# Patient Record
Sex: MALE | Race: BLACK OR AFRICAN AMERICAN | NOT HISPANIC OR LATINO | Employment: OTHER | URBAN - METROPOLITAN AREA
[De-identification: names, ages, dates, MRNs, and addresses within clinical notes are randomized per-mention and may not be internally consistent; named-entity substitution may affect disease eponyms.]

---

## 2019-07-01 ENCOUNTER — HOSPITAL ENCOUNTER (OUTPATIENT)
Dept: GENERAL RADIOLOGY | Facility: HOSPITAL | Age: 59
Discharge: HOME | End: 2019-07-01
Admitting: ANESTHESIOLOGY

## 2019-07-01 ENCOUNTER — TRANSCRIBE ORDERS (OUTPATIENT)
Dept: ADMINISTRATIVE | Facility: HOSPITAL | Age: 59
End: 2019-07-01

## 2019-07-01 ENCOUNTER — HOSPITAL ENCOUNTER (OUTPATIENT)
Dept: CARDIOLOGY | Facility: HOSPITAL | Age: 59
Discharge: HOME | End: 2019-07-01

## 2019-07-01 ENCOUNTER — LAB (OUTPATIENT)
Dept: LAB | Facility: HOSPITAL | Age: 59
End: 2019-07-01

## 2019-07-01 DIAGNOSIS — Z01.818 PRE-OP TESTING: ICD-10-CM

## 2019-07-01 DIAGNOSIS — N13.8 ENLARGED PROSTATE WITH URINARY OBSTRUCTION: ICD-10-CM

## 2019-07-01 DIAGNOSIS — N40.1 ENLARGED PROSTATE WITH URINARY OBSTRUCTION: ICD-10-CM

## 2019-07-01 DIAGNOSIS — N40.1 ENLARGED PROSTATE WITH URINARY OBSTRUCTION: Primary | ICD-10-CM

## 2019-07-01 DIAGNOSIS — N13.8 ENLARGED PROSTATE WITH URINARY OBSTRUCTION: Primary | ICD-10-CM

## 2019-07-01 LAB
ABO GROUP BLD: NORMAL
ANION GAP SERPL CALCULATED.3IONS-SCNC: 13.9 MMOL/L (ref 10–20)
APTT PPP: 26.5 SECONDS (ref 24–31)
BASOPHILS # BLD AUTO: 0.1 10*3/MM3 (ref 0–0.2)
BASOPHILS NFR BLD AUTO: 0.9 % (ref 0–1.5)
BLD GP AB SCN SERPL QL: NEGATIVE
BUN BLD-MCNC: 12 MG/DL (ref 8–20)
BUN/CREAT SERPL: 10 (ref 6.2–20.3)
CALCIUM SPEC-SCNC: 9.1 MG/DL (ref 8.9–10.3)
CHLORIDE SERPL-SCNC: 102 MMOL/L (ref 101–111)
CO2 SERPL-SCNC: 27 MMOL/L (ref 22–32)
CREAT BLD-MCNC: 1.2 MG/DL (ref 0.7–1.2)
DEPRECATED RDW RBC AUTO: 41.1 FL (ref 37–54)
EOSINOPHIL # BLD AUTO: 0.4 10*3/MM3 (ref 0–0.4)
EOSINOPHIL NFR BLD AUTO: 5.2 % (ref 0.3–6.2)
ERYTHROCYTE [DISTWIDTH] IN BLOOD BY AUTOMATED COUNT: 13.8 % (ref 12.3–15.4)
GFR SERPL CREATININE-BSD FRML MDRD: 75 ML/MIN/1.73
GLUCOSE BLD-MCNC: 96 MG/DL (ref 65–99)
HCT VFR BLD AUTO: 38.7 % (ref 37.5–51)
HGB BLD-MCNC: 13 G/DL (ref 13–17.7)
INR PPP: 0.93 (ref 0.9–1.1)
LYMPHOCYTES # BLD AUTO: 1.2 10*3/MM3 (ref 0.7–3.1)
LYMPHOCYTES NFR BLD AUTO: 15.5 % (ref 19.6–45.3)
MCH RBC QN AUTO: 28.5 PG (ref 26.6–33)
MCHC RBC AUTO-ENTMCNC: 33.7 G/DL (ref 31.5–35.7)
MCV RBC AUTO: 84.5 FL (ref 79–97)
MONOCYTES # BLD AUTO: 0.6 10*3/MM3 (ref 0.1–0.9)
MONOCYTES NFR BLD AUTO: 7.7 % (ref 5–12)
NEUTROPHILS # BLD AUTO: 5.6 10*3/MM3 (ref 1.7–7)
NEUTROPHILS NFR BLD AUTO: 70.7 % (ref 42.7–76)
NRBC BLD AUTO-RTO: 0.1 /100 WBC (ref 0–0.2)
PLATELET # BLD AUTO: 345 10*3/MM3 (ref 140–450)
PMV BLD AUTO: 7.2 FL (ref 6–12)
POTASSIUM BLD-SCNC: 3.9 MMOL/L (ref 3.6–5.1)
PROTHROMBIN TIME: 9.7 SECONDS (ref 9.6–11.7)
RBC # BLD AUTO: 4.57 10*6/MM3 (ref 4.14–5.8)
RH BLD: POSITIVE
SODIUM BLD-SCNC: 139 MMOL/L (ref 136–144)
T&S EXPIRATION DATE: NORMAL
WBC NRBC COR # BLD: 7.9 10*3/MM3 (ref 3.4–10.8)

## 2019-07-01 PROCEDURE — 85025 COMPLETE CBC W/AUTO DIFF WBC: CPT

## 2019-07-01 PROCEDURE — 36415 COLL VENOUS BLD VENIPUNCTURE: CPT

## 2019-07-01 PROCEDURE — 85610 PROTHROMBIN TIME: CPT

## 2019-07-01 PROCEDURE — 86850 RBC ANTIBODY SCREEN: CPT | Performed by: ANESTHESIOLOGY

## 2019-07-01 PROCEDURE — 86901 BLOOD TYPING SEROLOGIC RH(D): CPT

## 2019-07-01 PROCEDURE — 86900 BLOOD TYPING SEROLOGIC ABO: CPT | Performed by: ANESTHESIOLOGY

## 2019-07-01 PROCEDURE — 93005 ELECTROCARDIOGRAM TRACING: CPT | Performed by: ANESTHESIOLOGY

## 2019-07-01 PROCEDURE — 71046 X-RAY EXAM CHEST 2 VIEWS: CPT

## 2019-07-01 PROCEDURE — 86901 BLOOD TYPING SEROLOGIC RH(D): CPT | Performed by: ANESTHESIOLOGY

## 2019-07-01 PROCEDURE — 80048 BASIC METABOLIC PNL TOTAL CA: CPT

## 2019-07-01 PROCEDURE — 86900 BLOOD TYPING SEROLOGIC ABO: CPT

## 2019-07-01 PROCEDURE — 85730 THROMBOPLASTIN TIME PARTIAL: CPT

## 2019-07-02 PROCEDURE — 93010 ELECTROCARDIOGRAM REPORT: CPT | Performed by: INTERNAL MEDICINE

## 2019-07-05 PROCEDURE — 88305 TISSUE EXAM BY PATHOLOGIST: CPT | Performed by: UROLOGY

## 2019-07-06 ENCOUNTER — LAB REQUISITION (OUTPATIENT)
Dept: LAB | Facility: HOSPITAL | Age: 59
End: 2019-07-06

## 2019-07-06 DIAGNOSIS — N40.1 ENLARGED PROSTATE WITH LOWER URINARY TRACT SYMPTOMS (LUTS): ICD-10-CM

## 2019-07-06 DIAGNOSIS — R33.9 RETENTION OF URINE: ICD-10-CM

## 2019-07-08 LAB
LAB AP CASE REPORT: NORMAL
PATH REPORT.FINAL DX SPEC: NORMAL
PATH REPORT.GROSS SPEC: NORMAL

## 2020-10-11 ENCOUNTER — OFFICE (OUTPATIENT)
Dept: URBAN - METROPOLITAN AREA LAB 2 | Facility: LAB | Age: 60
End: 2020-10-11
Payer: MEDICAID

## 2020-10-11 DIAGNOSIS — Z20.828 CONTACT WITH AND (SUSPECTED) EXPOSURE TO OTHER VIRAL COMMUNI: ICD-10-CM

## 2020-10-11 PROCEDURE — U0002 COVID-19 LAB TEST NON-CDC: HCPCS | Performed by: INTERNAL MEDICINE

## 2020-10-12 PROBLEM — Z12.11 SCREENING FOR COLONIC NEOPLASIA: Status: ACTIVE | Noted: 2020-10-13

## 2020-10-13 ENCOUNTER — AMBULATORY SURGICAL CENTER (OUTPATIENT)
Dept: URBAN - METROPOLITAN AREA SURGERY 17 | Facility: SURGERY | Age: 60
End: 2020-10-13
Payer: MEDICAID

## 2020-10-13 ENCOUNTER — OFFICE (OUTPATIENT)
Dept: URBAN - METROPOLITAN AREA PATHOLOGY 4 | Facility: PATHOLOGY | Age: 60
End: 2020-10-13
Payer: MEDICAID

## 2020-10-13 VITALS
DIASTOLIC BLOOD PRESSURE: 75 MMHG | OXYGEN SATURATION: 99 % | TEMPERATURE: 98.1 F | SYSTOLIC BLOOD PRESSURE: 128 MMHG | SYSTOLIC BLOOD PRESSURE: 151 MMHG | DIASTOLIC BLOOD PRESSURE: 73 MMHG | DIASTOLIC BLOOD PRESSURE: 106 MMHG | RESPIRATION RATE: 12 BRPM | RESPIRATION RATE: 13 BRPM | SYSTOLIC BLOOD PRESSURE: 125 MMHG | HEART RATE: 63 BPM | HEART RATE: 75 BPM | HEART RATE: 65 BPM | HEIGHT: 69 IN | RESPIRATION RATE: 17 BRPM | SYSTOLIC BLOOD PRESSURE: 121 MMHG | HEART RATE: 67 BPM | DIASTOLIC BLOOD PRESSURE: 59 MMHG | WEIGHT: 160 LBS | HEART RATE: 51 BPM | OXYGEN SATURATION: 98 % | DIASTOLIC BLOOD PRESSURE: 70 MMHG | RESPIRATION RATE: 14 BRPM | DIASTOLIC BLOOD PRESSURE: 61 MMHG | OXYGEN SATURATION: 100 % | SYSTOLIC BLOOD PRESSURE: 116 MMHG | SYSTOLIC BLOOD PRESSURE: 112 MMHG | DIASTOLIC BLOOD PRESSURE: 64 MMHG | DIASTOLIC BLOOD PRESSURE: 65 MMHG | HEART RATE: 78 BPM | RESPIRATION RATE: 15 BRPM | HEART RATE: 64 BPM | SYSTOLIC BLOOD PRESSURE: 175 MMHG | TEMPERATURE: 96.5 F | SYSTOLIC BLOOD PRESSURE: 115 MMHG | HEART RATE: 68 BPM | SYSTOLIC BLOOD PRESSURE: 130 MMHG | SYSTOLIC BLOOD PRESSURE: 108 MMHG | HEART RATE: 71 BPM | RESPIRATION RATE: 18 BRPM | RESPIRATION RATE: 16 BRPM | SYSTOLIC BLOOD PRESSURE: 132 MMHG | HEART RATE: 66 BPM | DIASTOLIC BLOOD PRESSURE: 74 MMHG

## 2020-10-13 DIAGNOSIS — K57.30 DIVERTICULOSIS OF LARGE INTESTINE WITHOUT PERFORATION OR ABS: ICD-10-CM

## 2020-10-13 DIAGNOSIS — D12.0 BENIGN NEOPLASM OF CECUM: ICD-10-CM

## 2020-10-13 DIAGNOSIS — Z12.11 ENCOUNTER FOR SCREENING FOR MALIGNANT NEOPLASM OF COLON: ICD-10-CM

## 2020-10-13 DIAGNOSIS — K63.5 POLYP OF COLON: ICD-10-CM

## 2020-10-13 LAB
GI HISTOLOGY: A. UNSPECIFIED: (no result)
GI HISTOLOGY: B. UNSPECIFIED: (no result)
GI HISTOLOGY: PDF REPORT: (no result)

## 2020-10-13 PROCEDURE — 45385 COLONOSCOPY W/LESION REMOVAL: CPT | Mod: PT | Performed by: INTERNAL MEDICINE

## 2020-10-13 PROCEDURE — 88305 TISSUE EXAM BY PATHOLOGIST: CPT | Performed by: INTERNAL MEDICINE

## 2020-10-13 NOTE — SERVICEHPINOTES
60-year-old -American gentleman without GI complaints.  Family history is negative.  He presents for a screening colonoscopy. He says he had polyps removed years ago but does not know how long ago or the type of polyps.

## 2021-03-22 ENCOUNTER — BULK ORDERING (OUTPATIENT)
Dept: CASE MANAGEMENT | Facility: OTHER | Age: 61
End: 2021-03-22

## 2021-03-22 DIAGNOSIS — Z23 IMMUNIZATION DUE: ICD-10-CM

## 2022-01-28 ENCOUNTER — HOSPITAL ENCOUNTER (OUTPATIENT)
Dept: GENERAL RADIOLOGY | Facility: HOSPITAL | Age: 62
Discharge: HOME | End: 2022-01-28
Admitting: INTERNAL MEDICINE

## 2022-01-28 ENCOUNTER — OFFICE VISIT (OUTPATIENT)
Dept: INTERNAL MEDICINE | Facility: CLINIC | Age: 62
End: 2022-01-28

## 2022-01-28 VITALS
WEIGHT: 157 LBS | DIASTOLIC BLOOD PRESSURE: 100 MMHG | TEMPERATURE: 98 F | HEIGHT: 69 IN | BODY MASS INDEX: 23.25 KG/M2 | SYSTOLIC BLOOD PRESSURE: 160 MMHG

## 2022-01-28 DIAGNOSIS — I49.3 PVC (PREMATURE VENTRICULAR CONTRACTION): ICD-10-CM

## 2022-01-28 DIAGNOSIS — I10 BENIGN ESSENTIAL HYPERTENSION: ICD-10-CM

## 2022-01-28 DIAGNOSIS — E55.9 VITAMIN D DEFICIENCY: Primary | ICD-10-CM

## 2022-01-28 DIAGNOSIS — D86.0 PULMONARY SARCOIDOSIS: Primary | ICD-10-CM

## 2022-01-28 DIAGNOSIS — Z11.59 NEED FOR HEPATITIS C SCREENING TEST: ICD-10-CM

## 2022-01-28 DIAGNOSIS — E55.9 VITAMIN D DEFICIENCY: ICD-10-CM

## 2022-01-28 DIAGNOSIS — B35.1 ONYCHOMYCOSIS: ICD-10-CM

## 2022-01-28 DIAGNOSIS — R21 RASH: ICD-10-CM

## 2022-01-28 PROBLEM — N40.0 BENIGN PROSTATIC HYPERPLASIA: Status: ACTIVE | Noted: 2020-08-14

## 2022-01-28 PROBLEM — N20.0 KIDNEY STONE: Status: ACTIVE | Noted: 2020-08-14

## 2022-01-28 PROCEDURE — 93000 ELECTROCARDIOGRAM COMPLETE: CPT | Performed by: INTERNAL MEDICINE

## 2022-01-28 PROCEDURE — 99204 OFFICE O/P NEW MOD 45 MIN: CPT | Performed by: INTERNAL MEDICINE

## 2022-01-28 PROCEDURE — 71046 X-RAY EXAM CHEST 2 VIEWS: CPT

## 2022-01-28 RX ORDER — LOSARTAN POTASSIUM 100 MG/1
100 TABLET ORAL DAILY
Qty: 90 TABLET | Refills: 1 | Status: SHIPPED | OUTPATIENT
Start: 2022-01-28 | End: 2022-05-24 | Stop reason: SDUPTHER

## 2022-01-28 RX ORDER — TAMSULOSIN HYDROCHLORIDE 0.4 MG/1
CAPSULE ORAL
COMMUNITY

## 2022-01-28 RX ORDER — LOSARTAN POTASSIUM 100 MG/1
TABLET ORAL
COMMUNITY
End: 2022-01-28 | Stop reason: SDUPTHER

## 2022-01-28 RX ORDER — TERBINAFINE HYDROCHLORIDE 250 MG/1
250 TABLET ORAL DAILY
COMMUNITY
End: 2022-03-29

## 2022-01-28 RX ORDER — FLUTICASONE PROPIONATE 50 MCG
2 SPRAY, SUSPENSION (ML) NASAL AS NEEDED
COMMUNITY

## 2022-01-28 NOTE — PROGRESS NOTES
Subjective        Chief Complaint   Patient presents with   • Annual Exam           Wm Mandel is a 61 y.o. male who presents for    Patient Active Problem List   Diagnosis   • Pulmonary sarcoidosis (HCC)   • Onychomycosis   • Benign essential hypertension   • Benign prostatic hyperplasia       History of Present Illness     He had been seeing . He has not been seeing anyone in the interim. He retired as a . He had surgery on his bladder twice with Dr. Carrero. He has a h/o BPH. He has a suprapubic catheter. He has not been taking Losartan. He checks his BP but he does not remember the numbers. He sees the podiatrist and he is taking Lamisil and tolnaftate for nail fungus on his right great toe. He saw a pulmonologist years ago. He does not remember who he saw. He denies cough or dyspnea. He has had a rash on his shins.  He has been using a cream on it.  Allergies   Allergen Reactions   • Shingrix [Zoster Vac Recomb Adjuvanted] Rash       Current Outpatient Medications on File Prior to Visit   Medication Sig Dispense Refill   • fluticasone (FLONASE) 50 MCG/ACT nasal spray 2 sprays into the nostril(s) as directed by provider Daily.     • tamsulosin (FLOMAX) 0.4 MG capsule 24 hr capsule tamsulosin 0.4 mg capsule   TK 1 C PO QD     • terbinafine (lamiSIL) 250 MG tablet Take 250 mg by mouth Daily.     • Tolnaftate 1 % gel Apply  topically.     • [DISCONTINUED] losartan (COZAAR) 100 MG tablet losartan 100 mg tablet   TK 1 T PO  DAILY       No current facility-administered medications on file prior to visit.       Past Medical History:   Diagnosis Date   • Hyperlipidemia    • Tremor     seen at        Past Surgical History:   Procedure Laterality Date   • COLONOSCOPY  10/13/2020   • PROSTATE SURGERY     • SUPRAPUBIC CATHETER INSERTION         Family History   Problem Relation Age of Onset   • Cancer Mother    • Other Father         gunshot       Social History     Socioeconomic History   •  "Marital status: Single   Tobacco Use   • Smoking status: Never Smoker   • Smokeless tobacco: Never Used   Substance and Sexual Activity   • Alcohol use: Never   • Drug use: Never   • Sexual activity: Yes     Birth control/protection: Condom           The following portions of the patient's history were reviewed and updated as appropriate: problem list, allergies, current medications, past medical history, past family history, past social history and past surgical history.    Review of Systems   Constitutional: Negative for chills, fever and unexpected weight loss.   HENT: Negative for postnasal drip and sore throat.    Eyes: Negative for blurred vision.   Respiratory: Negative for cough, shortness of breath and wheezing.    Cardiovascular: Negative for chest pain and leg swelling.   Gastrointestinal: Negative for abdominal pain, blood in stool, nausea, vomiting and GERD.   Endocrine: Negative for polyuria.   Genitourinary: Negative for dysuria, frequency and hematuria.   Musculoskeletal: Negative for gait problem.   Skin: Negative for rash.   Allergic/Immunologic: Negative for immunocompromised state.   Neurological: Negative for weakness.   Hematological: Does not bruise/bleed easily.   Psychiatric/Behavioral: Negative for depressed mood. The patient is not nervous/anxious.        Immunization History   Administered Date(s) Administered   • COVID-19 (PFIZER) PURPLE CAP 04/01/2021, 04/22/2021, 11/10/2021   • Influenza, Unspecified 10/22/2021   • Tdap 09/11/2020       Objective   Vitals:    01/28/22 0937   BP: 160/100   Temp: 98 °F (36.7 °C)   Weight: 71.2 kg (157 lb)   Height: 175.3 cm (69\")     Body mass index is 23.18 kg/m².  Physical Exam  Vitals reviewed.   Constitutional:       Appearance: He is well-developed.   HENT:      Head: Normocephalic and atraumatic.      Comments: Enlarged tonsils with poor dentition     Mouth/Throat:      Mouth: Mucous membranes are moist.      Pharynx: Oropharynx is clear.   Eyes: "      Extraocular Movements: Extraocular movements intact.      Conjunctiva/sclera: Conjunctivae normal.      Pupils: Pupils are equal, round, and reactive to light.   Neck:      Thyroid: No thyromegaly.      Vascular: No carotid bruit.   Cardiovascular:      Rate and Rhythm: Normal rate and regular rhythm. Occasional extrasystoles are present.     Heart sounds: Normal heart sounds. No murmur heard.      Pulmonary:      Effort: Pulmonary effort is normal.      Breath sounds: Normal breath sounds.   Abdominal:      General: There is no distension.      Palpations: Abdomen is soft. There is no mass.      Tenderness: There is no abdominal tenderness. There is no rebound.      Comments: Suprapubic catheter in place   Musculoskeletal:      Cervical back: Neck supple.   Lymphadenopathy:      Cervical: No cervical adenopathy.   Skin:     General: Skin is warm.      Comments: Brown hyperpigmentation on his shins.   Neurological:      Mental Status: He is alert.   Psychiatric:         Behavior: Behavior normal.           ECG 12 Lead    Date/Time: 1/28/2022 10:38 AM  Performed by: Dale Andrade MD  Authorized by: Dale Andrade MD   Comparison: compared with previous ECG from 8/5/2020  Comparison to previous ECG: Now with PVC  Rhythm: sinus rhythm  Ectopy: unifocal PVCs  Rate: normal  Conduction: 1st degree AV block  QRS axis: normal    Clinical impression: abnormal EKG  Comments: First degree AVB with PVCs            Assessment/Plan   Diagnoses and all orders for this visit:    1. Pulmonary sarcoidosis (HCC) (Primary)  -     XR Chest PA & Lateral    2. Benign essential hypertension  -     Comprehensive Metabolic Panel  -     Lipid Panel With / Chol / HDL Ratio  -     CBC & Differential  -     TSH  -     Magnesium  -     losartan (COZAAR) 100 MG tablet; Take 1 tablet by mouth Daily.  Dispense: 90 tablet; Refill: 1  -     ECG 12 Lead    3. Rash  -     Ambulatory Referral to Dermatology    4. PVC (premature  ventricular contraction)  -     Holter monitor - 24 hour; Future  -     ECG 12 Lead    5. Need for hepatitis C screening test  -     Hepatitis C Antibody    6. Vitamin D deficiency  -     Vitamin D 25 Hydroxy               Get cscope from Dr. Cristhian Aguilera from 2020 and records Dr. Tejeda.    R/s Losartan. Work up PVCs. Reviewed labs he brought in from last PCP.  Return in about 4 weeks (around 2/25/2022), or 30 minutes.

## 2022-02-14 ENCOUNTER — HOSPITAL ENCOUNTER (OUTPATIENT)
Dept: CARDIOLOGY | Facility: HOSPITAL | Age: 62
Discharge: HOME | End: 2022-02-14
Admitting: INTERNAL MEDICINE

## 2022-02-14 DIAGNOSIS — I49.3 PVC (PREMATURE VENTRICULAR CONTRACTION): ICD-10-CM

## 2022-02-14 PROCEDURE — 93225 XTRNL ECG REC<48 HRS REC: CPT

## 2022-02-14 PROCEDURE — 93226 XTRNL ECG REC<48 HR SCAN A/R: CPT

## 2022-02-16 LAB
MAXIMAL PREDICTED HEART RATE: 158 BPM
STRESS TARGET HR: 134 BPM

## 2022-02-16 PROCEDURE — 93227 XTRNL ECG REC<48 HR R&I: CPT | Performed by: INTERNAL MEDICINE

## 2022-02-17 DIAGNOSIS — I49.3 PVC (PREMATURE VENTRICULAR CONTRACTION): Primary | ICD-10-CM

## 2022-02-18 ENCOUNTER — TELEPHONE (OUTPATIENT)
Dept: INTERNAL MEDICINE | Facility: CLINIC | Age: 62
End: 2022-02-18

## 2022-02-18 NOTE — TELEPHONE ENCOUNTER
Patient is wanting our office to get records from his surgeon Dr. Carrero urologist he has seen him since 2019, patient states he has had 2 surgeries with him and they have put him on a strict diet and he has been stressing out since then, patient also saw Dr. Tejeda  please call [phone]

## 2022-02-22 DIAGNOSIS — E55.9 VITAMIN D DEFICIENCY: Primary | ICD-10-CM

## 2022-02-22 LAB
25(OH)D3+25(OH)D2 SERPL-MCNC: 10.6 NG/ML (ref 30–100)
ALBUMIN SERPL-MCNC: 4.2 G/DL (ref 3.8–4.8)
ALBUMIN/GLOB SERPL: 1.7 {RATIO} (ref 1.2–2.2)
ALP SERPL-CCNC: 67 IU/L (ref 44–121)
ALT SERPL-CCNC: 13 IU/L (ref 0–44)
AST SERPL-CCNC: 17 IU/L (ref 0–40)
BASOPHILS # BLD AUTO: 0.1 X10E3/UL (ref 0–0.2)
BASOPHILS NFR BLD AUTO: 1 %
BILIRUB SERPL-MCNC: 0.4 MG/DL (ref 0–1.2)
BUN SERPL-MCNC: 18 MG/DL (ref 8–27)
BUN/CREAT SERPL: 14 (ref 10–24)
CALCIUM SERPL-MCNC: 9.1 MG/DL (ref 8.6–10.2)
CHLORIDE SERPL-SCNC: 104 MMOL/L (ref 96–106)
CHOLEST SERPL-MCNC: 153 MG/DL (ref 100–199)
CHOLEST/HDLC SERPL: 3.3 RATIO (ref 0–5)
CO2 SERPL-SCNC: 21 MMOL/L (ref 20–29)
CREAT SERPL-MCNC: 1.28 MG/DL (ref 0.76–1.27)
EOSINOPHIL # BLD AUTO: 0.3 X10E3/UL (ref 0–0.4)
EOSINOPHIL NFR BLD AUTO: 6 %
ERYTHROCYTE [DISTWIDTH] IN BLOOD BY AUTOMATED COUNT: 13.3 % (ref 11.6–15.4)
GLOBULIN SER CALC-MCNC: 2.5 G/DL (ref 1.5–4.5)
GLUCOSE SERPL-MCNC: 76 MG/DL (ref 65–99)
HCT VFR BLD AUTO: 39.8 % (ref 37.5–51)
HCV AB S/CO SERPL IA: <0.1 S/CO RATIO (ref 0–0.9)
HDLC SERPL-MCNC: 46 MG/DL
HGB BLD-MCNC: 13.2 G/DL (ref 13–17.7)
IMM GRANULOCYTES # BLD AUTO: 0 X10E3/UL (ref 0–0.1)
IMM GRANULOCYTES NFR BLD AUTO: 0 %
LDLC SERPL CALC-MCNC: 94 MG/DL (ref 0–99)
LYMPHOCYTES # BLD AUTO: 1.2 X10E3/UL (ref 0.7–3.1)
LYMPHOCYTES NFR BLD AUTO: 21 %
MAGNESIUM SERPL-MCNC: 2.2 MG/DL (ref 1.6–2.3)
MCH RBC QN AUTO: 27.8 PG (ref 26.6–33)
MCHC RBC AUTO-ENTMCNC: 33.2 G/DL (ref 31.5–35.7)
MCV RBC AUTO: 84 FL (ref 79–97)
MONOCYTES # BLD AUTO: 0.5 X10E3/UL (ref 0.1–0.9)
MONOCYTES NFR BLD AUTO: 8 %
NEUTROPHILS # BLD AUTO: 3.6 X10E3/UL (ref 1.4–7)
NEUTROPHILS NFR BLD AUTO: 64 %
PLATELET # BLD AUTO: 278 X10E3/UL (ref 150–450)
POTASSIUM SERPL-SCNC: 4.3 MMOL/L (ref 3.5–5.2)
PROT SERPL-MCNC: 6.7 G/DL (ref 6–8.5)
RBC # BLD AUTO: 4.74 X10E6/UL (ref 4.14–5.8)
SODIUM SERPL-SCNC: 139 MMOL/L (ref 134–144)
TRIGL SERPL-MCNC: 63 MG/DL (ref 0–149)
TSH SERPL DL<=0.005 MIU/L-ACNC: 1.75 UIU/ML (ref 0.45–4.5)
VLDLC SERPL CALC-MCNC: 13 MG/DL (ref 5–40)
WBC # BLD AUTO: 5.7 X10E3/UL (ref 3.4–10.8)

## 2022-02-22 RX ORDER — ERGOCALCIFEROL 1.25 MG/1
50000 CAPSULE ORAL WEEKLY
Qty: 5 CAPSULE | Refills: 5 | Status: SHIPPED | OUTPATIENT
Start: 2022-02-22 | End: 2022-03-29 | Stop reason: SDUPTHER

## 2022-02-24 ENCOUNTER — HOSPITAL ENCOUNTER (OUTPATIENT)
Dept: CARDIOLOGY | Facility: HOSPITAL | Age: 62
Discharge: HOME | End: 2022-02-24
Admitting: INTERNAL MEDICINE

## 2022-02-24 VITALS
BODY MASS INDEX: 23.25 KG/M2 | HEART RATE: 55 BPM | WEIGHT: 157 LBS | HEIGHT: 69 IN | DIASTOLIC BLOOD PRESSURE: 102 MMHG | SYSTOLIC BLOOD PRESSURE: 164 MMHG

## 2022-02-24 DIAGNOSIS — I49.3 PVC (PREMATURE VENTRICULAR CONTRACTION): ICD-10-CM

## 2022-02-24 PROCEDURE — 25010000002 PERFLUTREN (DEFINITY) 8.476 MG IN SODIUM CHLORIDE (PF) 0.9 % 10 ML INJECTION: Performed by: INTERNAL MEDICINE

## 2022-02-24 PROCEDURE — 93306 TTE W/DOPPLER COMPLETE: CPT | Performed by: INTERNAL MEDICINE

## 2022-02-24 PROCEDURE — 93306 TTE W/DOPPLER COMPLETE: CPT

## 2022-02-24 PROCEDURE — 93356 MYOCRD STRAIN IMG SPCKL TRCK: CPT

## 2022-02-24 PROCEDURE — 93356 MYOCRD STRAIN IMG SPCKL TRCK: CPT | Performed by: INTERNAL MEDICINE

## 2022-02-24 RX ADMIN — PERFLUTREN 3 ML: 6.52 INJECTION, SUSPENSION INTRAVENOUS at 14:36

## 2022-02-25 LAB
AORTIC ARCH: 2.9 CM
ASCENDING AORTA: 3.2 CM
BH CV ECHO LEFT VENTRICLE GLOBAL LONGITUDINAL STRAIN: -13.5 %
BH CV ECHO MEAS - ACS: 2.18 CM
BH CV ECHO MEAS - AO MAX PG: 3.4 MMHG
BH CV ECHO MEAS - AO MEAN PG: 2.19 MMHG
BH CV ECHO MEAS - AO ROOT DIAM: 3.5 CM
BH CV ECHO MEAS - AO V2 MAX: 92.3 CM/SEC
BH CV ECHO MEAS - AO V2 VTI: 22.1 CM
BH CV ECHO MEAS - AVA(I,D): 3 CM2
BH CV ECHO MEAS - EDV(CUBED): 170.9 ML
BH CV ECHO MEAS - EDV(MOD-SP2): 135 ML
BH CV ECHO MEAS - EDV(MOD-SP4): 145 ML
BH CV ECHO MEAS - EF(MOD-BP): 47.3 %
BH CV ECHO MEAS - EF(MOD-SP2): 46.7 %
BH CV ECHO MEAS - EF(MOD-SP4): 47.6 %
BH CV ECHO MEAS - ESV(CUBED): 66.5 ML
BH CV ECHO MEAS - ESV(MOD-SP2): 72 ML
BH CV ECHO MEAS - ESV(MOD-SP4): 76 ML
BH CV ECHO MEAS - FS: 27 %
BH CV ECHO MEAS - IVS/LVPW: 1.2 CM
BH CV ECHO MEAS - IVSD: 1.2 CM
BH CV ECHO MEAS - LAT PEAK E' VEL: 13.3 CM/SEC
BH CV ECHO MEAS - LV DIASTOLIC VOL/BSA (35-75): 77.8 CM2
BH CV ECHO MEAS - LV MASS(C)D: 245.1 GRAMS
BH CV ECHO MEAS - LV MAX PG: 3.5 MMHG
BH CV ECHO MEAS - LV MEAN PG: 1.55 MMHG
BH CV ECHO MEAS - LV SYSTOLIC VOL/BSA (12-30): 40.8 CM2
BH CV ECHO MEAS - LV V1 MAX: 93.8 CM/SEC
BH CV ECHO MEAS - LV V1 VTI: 20.9 CM
BH CV ECHO MEAS - LVIDD: 5.5 CM
BH CV ECHO MEAS - LVIDS: 4.1 CM
BH CV ECHO MEAS - LVOT AREA: 3.2 CM2
BH CV ECHO MEAS - LVOT DIAM: 2.01 CM
BH CV ECHO MEAS - LVPWD: 1 CM
BH CV ECHO MEAS - MED PEAK E' VEL: 11.4 CM/SEC
BH CV ECHO MEAS - MR MAX PG: 111.6 MMHG
BH CV ECHO MEAS - MR MAX VEL: 528.1 CM/SEC
BH CV ECHO MEAS - MV A DUR: 0.11 SEC
BH CV ECHO MEAS - MV A MAX VEL: 91.7 CM/SEC
BH CV ECHO MEAS - MV DEC SLOPE: 366.6 CM/SEC2
BH CV ECHO MEAS - MV DEC TIME: 0.16 MSEC
BH CV ECHO MEAS - MV E MAX VEL: 72 CM/SEC
BH CV ECHO MEAS - MV E/A: 0.79
BH CV ECHO MEAS - MV MAX PG: 3.4 MMHG
BH CV ECHO MEAS - MV MEAN PG: 1.59 MMHG
BH CV ECHO MEAS - MV V2 VTI: 22.9 CM
BH CV ECHO MEAS - MVA(VTI): 2.9 CM2
BH CV ECHO MEAS - PA ACC TIME: 0.09 SEC
BH CV ECHO MEAS - PA PR(ACCEL): 38.6 MMHG
BH CV ECHO MEAS - PA V2 MAX: 95 CM/SEC
BH CV ECHO MEAS - PULM A REVS DUR: 0.17 SEC
BH CV ECHO MEAS - PULM A REVS VEL: 30.4 CM/SEC
BH CV ECHO MEAS - PULM DIAS VEL: 56.6 CM/SEC
BH CV ECHO MEAS - PULM SYS VEL: 58.7 CM/SEC
BH CV ECHO MEAS - RAP SYSTOLE: 8 MMHG
BH CV ECHO MEAS - RV MAX PG: 1.36 MMHG
BH CV ECHO MEAS - RV V1 MAX: 58.3 CM/SEC
BH CV ECHO MEAS - RV V1 VTI: 15.4 CM
BH CV ECHO MEAS - RVOT DIAM: 2.06 CM
BH CV ECHO MEAS - RVSP: 35.2 MMHG
BH CV ECHO MEAS - SI(MOD-SP2): 33.8 ML/M2
BH CV ECHO MEAS - SI(MOD-SP4): 37 ML/M2
BH CV ECHO MEAS - SUP REN AO DIAM: 2.8 CM
BH CV ECHO MEAS - SV(LVOT): 65.9 ML
BH CV ECHO MEAS - SV(MOD-SP2): 63 ML
BH CV ECHO MEAS - SV(MOD-SP4): 69 ML
BH CV ECHO MEAS - SV(RVOT): 51.5 ML
BH CV ECHO MEAS - TAPSE (>1.6): 2.46 CM
BH CV ECHO MEAS - TR MAX PG: 27.2 MMHG
BH CV ECHO MEAS - TR MAX VEL: 260.6 CM/SEC
BH CV ECHO MEASUREMENTS AVERAGE E/E' RATIO: 5.83
BH CV XLRA - RV BASE: 2.38 CM
BH CV XLRA - RV LENGTH: 6.7 CM
BH CV XLRA - RV MID: 2.19 CM
BH CV XLRA - TDI S': 10 CM/SEC
LEFT ATRIUM VOLUME INDEX: 23.5 ML/M2
MAXIMAL PREDICTED HEART RATE: 158 BPM
SINUS: 3.1 CM
STJ: 3.2 CM
STRESS TARGET HR: 134 BPM

## 2022-03-01 ENCOUNTER — OFFICE VISIT (OUTPATIENT)
Dept: INTERNAL MEDICINE | Facility: CLINIC | Age: 62
End: 2022-03-01

## 2022-03-01 VITALS
SYSTOLIC BLOOD PRESSURE: 150 MMHG | HEIGHT: 69 IN | TEMPERATURE: 97.8 F | DIASTOLIC BLOOD PRESSURE: 80 MMHG | HEART RATE: 72 BPM | WEIGHT: 157 LBS | BODY MASS INDEX: 23.25 KG/M2

## 2022-03-01 DIAGNOSIS — I10 BENIGN ESSENTIAL HYPERTENSION: Chronic | ICD-10-CM

## 2022-03-01 DIAGNOSIS — E55.9 VITAMIN D DEFICIENCY: ICD-10-CM

## 2022-03-01 DIAGNOSIS — R93.1 DECREASED CARDIAC EJECTION FRACTION: Primary | ICD-10-CM

## 2022-03-01 PROCEDURE — 99214 OFFICE O/P EST MOD 30 MIN: CPT | Performed by: INTERNAL MEDICINE

## 2022-03-01 RX ORDER — METOPROLOL SUCCINATE 50 MG/1
50 TABLET, EXTENDED RELEASE ORAL DAILY
Qty: 30 TABLET | Refills: 2 | Status: SHIPPED | OUTPATIENT
Start: 2022-03-01 | End: 2022-05-24 | Stop reason: SDUPTHER

## 2022-03-01 RX ORDER — SPIRONOLACTONE 25 MG/1
25 TABLET ORAL DAILY
Qty: 30 TABLET | Refills: 2 | Status: SHIPPED | OUTPATIENT
Start: 2022-03-01 | End: 2022-05-24 | Stop reason: SDUPTHER

## 2022-03-01 NOTE — PROGRESS NOTES
Subjective        Chief Complaint   Patient presents with   • Hypertension           Wm Mandel is a 61 y.o. male who presents for    Patient Active Problem List   Diagnosis   • Pulmonary sarcoidosis (HCC)   • Onychomycosis   • Benign essential hypertension   • Benign prostatic hyperplasia   • Decreased cardiac ejection fraction   • Vitamin D deficiency       History of Present Illness     He denies chest pain, dyspnea, palpitations or edema. He has not been checking his BP.   Allergies   Allergen Reactions   • Shingrix [Zoster Vac Recomb Adjuvanted] Rash       Current Outpatient Medications on File Prior to Visit   Medication Sig Dispense Refill   • fluticasone (FLONASE) 50 MCG/ACT nasal spray 2 sprays into the nostril(s) as directed by provider Daily.     • losartan (COZAAR) 100 MG tablet Take 1 tablet by mouth Daily. 90 tablet 1   • tamsulosin (FLOMAX) 0.4 MG capsule 24 hr capsule tamsulosin 0.4 mg capsule   TK 1 C PO QD     • terbinafine (lamiSIL) 250 MG tablet Take 250 mg by mouth Daily.     • Tolnaftate 1 % gel Apply  topically.     • vitamin D (ERGOCALCIFEROL) 1.25 MG (55059 UT) capsule capsule Take 1 capsule by mouth 1 (One) Time Per Week. 5 capsule 5     No current facility-administered medications on file prior to visit.       Past Medical History:   Diagnosis Date   • Hyperlipidemia    • Tremor     seen at        Past Surgical History:   Procedure Laterality Date   • COLONOSCOPY  10/13/2020   • PROSTATE SURGERY     • SUPRAPUBIC CATHETER INSERTION         Family History   Problem Relation Age of Onset   • Cancer Mother    • Other Father         gunshot       Social History     Socioeconomic History   • Marital status: Single   Tobacco Use   • Smoking status: Never Smoker   • Smokeless tobacco: Never Used   Substance and Sexual Activity   • Alcohol use: Never   • Drug use: Never   • Sexual activity: Yes     Birth control/protection: Condom           The following portions of the patient's history  "were reviewed and updated as appropriate: problem list, allergies, current medications, past medical history, past family history, past social history and past surgical history.    Review of Systems    Immunization History   Administered Date(s) Administered   • COVID-19 (PFIZER) PURPLE CAP 04/01/2021, 04/22/2021, 11/10/2021   • Influenza, Unspecified 10/22/2021   • Tdap 09/11/2020       Objective   Vitals:    03/01/22 1357   BP: 150/80   Pulse: 72   Temp: 97.8 °F (36.6 °C)   Weight: 71.2 kg (157 lb)   Height: 175.3 cm (69.02\")     Body mass index is 23.17 kg/m².  Physical Exam  Vitals reviewed.   Constitutional:       Appearance: He is well-developed.   HENT:      Head: Normocephalic and atraumatic.   Cardiovascular:      Rate and Rhythm: Normal rate and regular rhythm. Frequent extrasystoles are present.     Heart sounds: Normal heart sounds, S1 normal and S2 normal.      Comments: Trace edema at sock line  Pulmonary:      Effort: Pulmonary effort is normal.      Breath sounds: Normal breath sounds.   Skin:     General: Skin is warm.   Neurological:      Mental Status: He is alert.   Psychiatric:         Behavior: Behavior normal.         Procedures    Assessment/Plan   Diagnoses and all orders for this visit:    1. Decreased cardiac ejection fraction (Primary)  -     metoprolol succinate XL (Toprol XL) 50 MG 24 hr tablet; Take 1 tablet by mouth Daily.  Dispense: 30 tablet; Refill: 2  -     spironolactone (Aldactone) 25 MG tablet; Take 1 tablet by mouth Daily.  Dispense: 30 tablet; Refill: 2  -     Basic Metabolic Panel; Future    2. Benign essential hypertension  -     metoprolol succinate XL (Toprol XL) 50 MG 24 hr tablet; Take 1 tablet by mouth Daily.  Dispense: 30 tablet; Refill: 2  -     spironolactone (Aldactone) 25 MG tablet; Take 1 tablet by mouth Daily.  Dispense: 30 tablet; Refill: 2  -     Basic Metabolic Panel; Future    3. Vitamin D deficiency  Comments:  Continue weekly replacement           "     Reviewed records from prior PCP as well as echo, holter, CXR, cmp, cbc, and flp. Get into cards; he has an appt with Dr. Craven scheduled for this month. His decreased EF may be from HTN. Discussed avoiding salt. Add Toprol and spironolactone. I will repeat a bmp in 10 days.  Return in about 4 weeks (around 3/29/2022).

## 2022-03-22 ENCOUNTER — OFFICE VISIT (OUTPATIENT)
Dept: CARDIOLOGY | Facility: CLINIC | Age: 62
End: 2022-03-22

## 2022-03-22 VITALS
OXYGEN SATURATION: 96 % | SYSTOLIC BLOOD PRESSURE: 120 MMHG | BODY MASS INDEX: 22.22 KG/M2 | HEIGHT: 69 IN | HEART RATE: 71 BPM | WEIGHT: 150 LBS | DIASTOLIC BLOOD PRESSURE: 60 MMHG

## 2022-03-22 DIAGNOSIS — I42.9 CARDIOMYOPATHY, UNSPECIFIED TYPE: Primary | ICD-10-CM

## 2022-03-22 DIAGNOSIS — R94.31 ABNORMAL ELECTROCARDIOGRAM (ECG) (EKG): ICD-10-CM

## 2022-03-22 PROCEDURE — 99204 OFFICE O/P NEW MOD 45 MIN: CPT | Performed by: INTERNAL MEDICINE

## 2022-03-29 ENCOUNTER — OFFICE VISIT (OUTPATIENT)
Dept: INTERNAL MEDICINE | Facility: CLINIC | Age: 62
End: 2022-03-29

## 2022-03-29 VITALS
HEIGHT: 69 IN | TEMPERATURE: 97.8 F | SYSTOLIC BLOOD PRESSURE: 120 MMHG | BODY MASS INDEX: 21.92 KG/M2 | WEIGHT: 148 LBS | DIASTOLIC BLOOD PRESSURE: 72 MMHG | HEART RATE: 54 BPM

## 2022-03-29 DIAGNOSIS — E55.9 VITAMIN D DEFICIENCY: ICD-10-CM

## 2022-03-29 DIAGNOSIS — R93.1 DECREASED CARDIAC EJECTION FRACTION: Chronic | ICD-10-CM

## 2022-03-29 DIAGNOSIS — I10 BENIGN ESSENTIAL HYPERTENSION: Primary | Chronic | ICD-10-CM

## 2022-03-29 DIAGNOSIS — R21 RASH OF GROIN: ICD-10-CM

## 2022-03-29 PROCEDURE — 99214 OFFICE O/P EST MOD 30 MIN: CPT | Performed by: INTERNAL MEDICINE

## 2022-03-29 RX ORDER — ERGOCALCIFEROL 1.25 MG/1
50000 CAPSULE ORAL WEEKLY
Qty: 12 CAPSULE | Refills: 3 | Status: SHIPPED | OUTPATIENT
Start: 2022-03-29 | End: 2022-09-15

## 2022-03-29 NOTE — PROGRESS NOTES
Subjective        Chief Complaint   Patient presents with   • Hypertension           Wm Mandel is a 61 y.o. male who presents for    Patient Active Problem List   Diagnosis   • Pulmonary sarcoidosis (HCC)   • Onychomycosis   • Benign essential hypertension   • Benign prostatic hyperplasia   • Decreased cardiac ejection fraction   • Vitamin D deficiency       History of Present Illness     He saw Dr. Craven. He was started on Jardiance. His BP was 122/60 with cardiology. He denies chest pain or dyspnea. He has no side effects with his meds. He had a rash in his groin for several years. He has tried OTC meds without relief.  Allergies   Allergen Reactions   • Shingrix [Zoster Vac Recomb Adjuvanted] Rash       Current Outpatient Medications on File Prior to Visit   Medication Sig Dispense Refill   • empagliflozin (Jardiance) 10 MG tablet tablet Take 1 tablet by mouth Daily. 30 tablet 11   • fluticasone (FLONASE) 50 MCG/ACT nasal spray 2 sprays into the nostril(s) as directed by provider As Needed.     • losartan (COZAAR) 100 MG tablet Take 1 tablet by mouth Daily. 90 tablet 1   • metoprolol succinate XL (Toprol XL) 50 MG 24 hr tablet Take 1 tablet by mouth Daily. 30 tablet 2   • spironolactone (Aldactone) 25 MG tablet Take 1 tablet by mouth Daily. 30 tablet 2   • tamsulosin (FLOMAX) 0.4 MG capsule 24 hr capsule tamsulosin 0.4 mg capsule   TK 1 C PO QD     • Tolnaftate 1 % gel Apply  topically.     • [DISCONTINUED] terbinafine (lamiSIL) 250 MG tablet Take 250 mg by mouth Daily.     • [DISCONTINUED] vitamin D (ERGOCALCIFEROL) 1.25 MG (45540 UT) capsule capsule Take 1 capsule by mouth 1 (One) Time Per Week. 5 capsule 5     No current facility-administered medications on file prior to visit.       Past Medical History:   Diagnosis Date   • Hyperlipidemia    • Leukocytoclastic vasculitis (HCC)    • Tremor     seen at        Past Surgical History:   Procedure Laterality Date   • COLONOSCOPY  10/13/2020   • LUNG  "SURGERY     • PROSTATE SURGERY     • SUPRAPUBIC CATHETER INSERTION         Family History   Problem Relation Age of Onset   • Cancer Mother    • Other Father         gunshot       Social History     Socioeconomic History   • Marital status: Single   Tobacco Use   • Smoking status: Never Smoker   • Smokeless tobacco: Never Used   • Tobacco comment: caffeine use- denies   Substance and Sexual Activity   • Alcohol use: Never   • Drug use: Never   • Sexual activity: Yes     Birth control/protection: Condom           The following portions of the patient's history were reviewed and updated as appropriate: problem list, allergies, current medications, past medical history, past family history, past social history and past surgical history.    Review of Systems    Immunization History   Administered Date(s) Administered   • COVID-19 (PFIZER) PURPLE CAP 04/01/2021, 04/22/2021, 11/10/2021   • Influenza, Unspecified 10/22/2021   • Tdap 09/11/2020       Objective   Vitals:    03/29/22 1359   BP: 120/72   Pulse: 54   Temp: 97.8 °F (36.6 °C)   Weight: 67.1 kg (148 lb)   Height: 175.3 cm (69.02\")     Body mass index is 21.85 kg/m².  Physical Exam  Vitals reviewed.   Constitutional:       Appearance: He is well-developed.   HENT:      Head: Normocephalic and atraumatic.   Cardiovascular:      Rate and Rhythm: Normal rate and regular rhythm.      Heart sounds: Normal heart sounds, S1 normal and S2 normal.   Pulmonary:      Effort: Pulmonary effort is normal.      Breath sounds: Normal breath sounds.   Skin:     General: Skin is warm.      Comments: Left medical thigh with scaly red    Neurological:      Mental Status: He is alert.   Psychiatric:         Behavior: Behavior normal.         Procedures    Assessment/Plan   Diagnoses and all orders for this visit:    1. Benign essential hypertension (Primary)  -     Basic Metabolic Panel; Future    2. Decreased cardiac ejection fraction  Comments:  To start Jardiance and have repeat " echo in May.    3. Vitamin D deficiency  -     vitamin D (ERGOCALCIFEROL) 1.25 MG (60110 UT) capsule capsule; Take 1 capsule by mouth 1 (One) Time Per Week.  Dispense: 12 capsule; Refill: 3  -     Vitamin D 25 Hydroxy; Future    4. Rash of groin  -     econazole nitrate (SPECTAZOLE) 1 % cream; Apply 1 application topically to the appropriate area as directed 2 (Two) Times a Day.  Dispense: 30 g; Refill: 1               Bmp noted. Recheck Cr in f/u. He has seen cards and to have a repeat echo in May. BP is excellent. Trial anti fungal for left groin. He has decreased his sodium.  Return in about 4 months (around 7/29/2022) for Medicare Wellness, Lab Before FUP.

## 2022-04-04 NOTE — PROGRESS NOTES
Date of Office Visit: 2022  Encounter Provider: Daniel Craven MD  Place of Service: [facility] CARDIOLOGY  Patient Name: Wm Mandel  :1960    Chief complaint: Cardiomyopathy, PVCs.    History of Present Illness:    I had the pleasure of seeing the patient in cardiology office on 3/22/2022.  He is a very pleasant 61 year-old male with a history of pulmonary sarcoidosis, urinary retention status post suprapubic catheter, cardiomyopathy, and PVCs who presents for evaluation.    The patient recently was found to have PVCs on EKG, wore a 24-hour Holter monitor on 2022.  He did have frequent ectopy with a 16.6% PVC burden.  However, he is completely asymptomatic from the PVCs and does not feel palpitations.  An echocardiogram was obtained on 2022 which showed mild left ventricular enlargement with global hypokinesis and an ejection fraction of 40 to 45%.  On questioning, he does not feel short of breath, and he has had no chest discomfort.  He really has no signs or symptoms of congestive heart failure.  He does tell me that his blood pressure had been elevated for quite some time, but is now under control.  Dr. Andrade has him on Toprol-XL, spironolactone, and losartan for treatment of his hypertension and cardiomyopathy.    Past Medical History:   Diagnosis Date   • Hyperlipidemia    • Leukocytoclastic vasculitis (HCC)    • Tremor     seen at        Past Surgical History:   Procedure Laterality Date   • COLONOSCOPY  10/13/2020   • LUNG SURGERY     • PROSTATE SURGERY     • SUPRAPUBIC CATHETER INSERTION         Current Outpatient Medications on File Prior to Visit   Medication Sig Dispense Refill   • fluticasone (FLONASE) 50 MCG/ACT nasal spray 2 sprays into the nostril(s) as directed by provider As Needed.     • losartan (COZAAR) 100 MG tablet Take 1 tablet by mouth Daily. 90 tablet 1   • metoprolol succinate XL (Toprol XL) 50 MG 24 hr tablet Take 1  "tablet by mouth Daily. 30 tablet 2   • spironolactone (Aldactone) 25 MG tablet Take 1 tablet by mouth Daily. 30 tablet 2   • tamsulosin (FLOMAX) 0.4 MG capsule 24 hr capsule tamsulosin 0.4 mg capsule   TK 1 C PO QD     • Tolnaftate 1 % gel Apply  topically.       No current facility-administered medications on file prior to visit.     Allergies as of 03/22/2022 - Reviewed 03/22/2022   Allergen Reaction Noted   • Shingrix [zoster vac recomb adjuvanted] Rash 01/28/2022     Social History     Socioeconomic History   • Marital status: Single   Tobacco Use   • Smoking status: Never Smoker   • Smokeless tobacco: Never Used   • Tobacco comment: caffeine use- denies   Substance and Sexual Activity   • Alcohol use: Never   • Drug use: Never   • Sexual activity: Yes     Birth control/protection: Condom     Family History   Problem Relation Age of Onset   • Cancer Mother    • Other Father         gunshot       Review of Systems   All other systems reviewed and are negative.     Objective:     Vitals:    03/22/22 1134   BP: 120/60   BP Location: Left arm   Pulse: 71   SpO2: 96%   Weight: 68 kg (150 lb)   Height: 175.3 cm (69\")     Body mass index is 22.15 kg/m².    Constitutional:       Appearance: Well-developed.   Eyes:      Conjunctiva/sclera: Conjunctivae normal.   HENT:      Head: Normocephalic and atraumatic.   Pulmonary:      Effort: Pulmonary effort is normal.      Breath sounds: Normal breath sounds.   Cardiovascular:      Normal rate. Regular rhythm.      Murmurs: There is no murmur.      No gallop.   Edema:     Peripheral edema absent.   Abdominal:      Palpations: Abdomen is soft.      Tenderness: There is no abdominal tenderness.   Musculoskeletal:      Cervical back: Neck supple. Skin:     General: Skin is warm.   Neurological:      Mental Status: Alert and oriented to person, place, and time.   Psychiatric:         Behavior: Behavior normal.       Lab Review:   Procedures    Lipid Panel    Lipid Panel 2/21/22 "   Total Cholesterol 153   Triglycerides 63   HDL Cholesterol 46   VLDL Cholesterol 13   LDL Cholesterol  94              Cardiac Procedures:  1.  24-hour Holter monitor on 2/14/2022: Frequent ectopy with a 16.6% PVC burden.  2.  Echocardiogram on 2/24/2022: The ejection fraction was 40 to 45%.  The left ventricle was mildly enlarged.  There was global hypokinesis.  There was borderline LVH.  There was mild to moderate mitral regurgitation.    Assessment:       Diagnosis Plan   1. Cardiomyopathy, unspecified type (HCC)  Adult Transthoracic Echo Complete w/ Color, Spectral and Contrast if Necessary Per Protocol   2. Abnormal electrocardiogram (ECG) (EKG)   Adult Transthoracic Echo Complete w/ Color, Spectral and Contrast if Necessary Per Protocol     Plan:       Again, the patient has no signs or symptoms of congestive heart failure.  I suspect that the etiology of his cardiomyopathy may be hypertension, which is now well controlled.  However, he does have pulmonary sarcoidosis, and this may be a less likely cause.  I did review the echocardiogram, and I agree that there is global hypokinesis and no regional wall motion abnormalities which are worse.  There is also very little LVH, which would go against sarcoidosis as a potential cause.  He now is on losartan, Toprol-XL, and spironolactone.  His blood pressure and heart rate are excellent.  I am going to add Jardiance to his regimen.  I will plan on repeating an echocardiogram in early May 2022, which will be 3 months from the previous study.  If his ejection fraction is not improved, I would consider other modalities such as a cardiac catheterization or cardiac MRI to look for sarcoidosis.  Even at over 16% PVC burden, it would be extremely rare for this to be a PVC induced cardiomyopathy.

## 2022-05-18 ENCOUNTER — OFFICE VISIT (OUTPATIENT)
Dept: INTERNAL MEDICINE | Facility: CLINIC | Age: 62
End: 2022-05-18

## 2022-05-18 VITALS — TEMPERATURE: 100.9 F | OXYGEN SATURATION: 96 % | HEIGHT: 69 IN | HEART RATE: 82 BPM | BODY MASS INDEX: 21.85 KG/M2

## 2022-05-18 DIAGNOSIS — U07.1 COVID-19: Primary | ICD-10-CM

## 2022-05-18 PROCEDURE — 99213 OFFICE O/P EST LOW 20 MIN: CPT | Performed by: PHYSICIAN ASSISTANT

## 2022-05-18 NOTE — PROGRESS NOTES
Subjective   Chief Complaint   Patient presents with   • Cough   Pt in mask and I was in full PPE.     History of Present Illness     strted coughing yesterday evening. Has some body aches today. Has not tested for covid at home. Has had his vaccinations. No SOA.      Patient Active Problem List   Diagnosis   • Pulmonary sarcoidosis (HCC)   • Onychomycosis   • Benign essential hypertension   • Benign prostatic hyperplasia   • Decreased cardiac ejection fraction   • Vitamin D deficiency       Allergies   Allergen Reactions   • Shingrix [Zoster Vac Recomb Adjuvanted] Rash       Current Outpatient Medications on File Prior to Visit   Medication Sig Dispense Refill   • econazole nitrate (SPECTAZOLE) 1 % cream Apply 1 application topically to the appropriate area as directed 2 (Two) Times a Day. 30 g 1   • fluticasone (FLONASE) 50 MCG/ACT nasal spray 2 sprays into the nostril(s) as directed by provider As Needed.     • tamsulosin (FLOMAX) 0.4 MG capsule 24 hr capsule tamsulosin 0.4 mg capsule   TK 1 C PO QD     • Tolnaftate 1 % gel Apply  topically.     • vitamin D (ERGOCALCIFEROL) 1.25 MG (15265 UT) capsule capsule Take 1 capsule by mouth 1 (One) Time Per Week. 12 capsule 3     No current facility-administered medications on file prior to visit.       Past Medical History:   Diagnosis Date   • Hyperlipidemia    • Leukocytoclastic vasculitis (HCC)    • Tremor     seen at        Family History   Problem Relation Age of Onset   • Cancer Mother    • Other Father         gunshot       Social History     Socioeconomic History   • Marital status: Single   Tobacco Use   • Smoking status: Never Smoker   • Smokeless tobacco: Never Used   • Tobacco comment: caffeine use- denies   Substance and Sexual Activity   • Alcohol use: Never   • Drug use: Never   • Sexual activity: Yes     Birth control/protection: Condom       Past Surgical History:   Procedure Laterality Date   • COLONOSCOPY  10/13/2020   • LUNG SURGERY     • PROSTATE  "SURGERY     • SUPRAPUBIC CATHETER INSERTION           The following portions of the patient's history were reviewed and updated as appropriate: problem list, allergies, current medications, past medical history, past family history, past social history and past surgical history.    ROS    See HPI  Immunization History   Administered Date(s) Administered   • COVID-19 (PFIZER) PURPLE CAP 04/01/2021, 04/22/2021, 11/10/2021   • Flu Vaccine Quad PF >36MO 09/11/2020   • Flu Vaccine Split Quad 02/04/2020   • Influenza, Unspecified 10/22/2021   • Tdap 09/11/2020       Objective   Vitals:    05/18/22 1505   Pulse: 82   Temp: (!) 100.9 °F (38.3 °C)   SpO2: 96%   Height: 175.3 cm (69.02\")     Body mass index is 21.85 kg/m².  Physical Exam  Vitals reviewed.   Constitutional:       Appearance: He is well-developed.   HENT:      Head: Normocephalic and atraumatic.   Cardiovascular:      Rate and Rhythm: Normal rate and regular rhythm.      Heart sounds: Normal heart sounds, S1 normal and S2 normal.   Pulmonary:      Effort: Pulmonary effort is normal.      Breath sounds: Normal breath sounds.   Skin:     General: Skin is warm.   Neurological:      Mental Status: He is alert.   Psychiatric:         Behavior: Behavior normal.           Assessment & Plan   Diagnoses and all orders for this visit:    1. COVID-19 (Primary)    Pt is COVID positive, continue to treat symptoms. To quarantine days 1-5, days 6-10 can wear a N95 mask. Day 11 on can resume normal activity. Call if sx worsen.     No follow-ups on file.           "

## 2022-05-24 ENCOUNTER — TELEPHONE (OUTPATIENT)
Dept: INTERNAL MEDICINE | Facility: CLINIC | Age: 62
End: 2022-05-24

## 2022-05-24 DIAGNOSIS — R93.1 DECREASED CARDIAC EJECTION FRACTION: ICD-10-CM

## 2022-05-24 DIAGNOSIS — I10 BENIGN ESSENTIAL HYPERTENSION: ICD-10-CM

## 2022-05-24 RX ORDER — METOPROLOL SUCCINATE 50 MG/1
50 TABLET, EXTENDED RELEASE ORAL DAILY
Qty: 30 TABLET | Refills: 2 | Status: SHIPPED | OUTPATIENT
Start: 2022-05-24 | End: 2022-09-12

## 2022-05-24 RX ORDER — SPIRONOLACTONE 25 MG/1
25 TABLET ORAL DAILY
Qty: 30 TABLET | Refills: 2 | Status: SHIPPED | OUTPATIENT
Start: 2022-05-24 | End: 2022-09-12

## 2022-05-24 RX ORDER — LOSARTAN POTASSIUM 100 MG/1
100 TABLET ORAL DAILY
Qty: 90 TABLET | Refills: 1 | Status: CANCELLED | OUTPATIENT
Start: 2022-05-24

## 2022-05-24 RX ORDER — LOSARTAN POTASSIUM 100 MG/1
100 TABLET ORAL DAILY
Qty: 90 TABLET | Refills: 1 | Status: SHIPPED | OUTPATIENT
Start: 2022-05-24 | End: 2022-09-15 | Stop reason: SDUPTHER

## 2022-05-24 NOTE — TELEPHONE ENCOUNTER
I sent in all his meds. He needs to stay on all of them with his high blood pressure and weak heart muscle    He needs a Medicare Wellness at the end of July or in August with labs.

## 2022-05-24 NOTE — TELEPHONE ENCOUNTER
Caller: Wm Mandel    Relationship: Self    Best call back number: [phone]    Requested Prescriptions:   Requested Prescriptions     Pending Prescriptions Disp Refills   • losartan (COZAAR) 100 MG tablet 90 tablet 1     Sig: Take 1 tablet by mouth Daily.        Pharmacy where request should be sent: [facility]/PHARMACY #[id number] [place], [place] - [address] AT [address] & [facility] - [phone] [facility] [phone] FX     Additional details provided by patient: PLEASE CONTACT PATIENT BACK.  PATIENT STATES THAT HIS BLOOD PRESSURE IS BACK TO NORMAL WITHOUT HAVING TAKEN THIS IN OVER A MONTH, DOES HE STILL NEED THIS MEDICATION AND ALSO THE JARDIANCE 10 MG?   PATIENT WAS GIVEN THE JARDIANCE BY HIS CARDIOLOGIST AND BECAUSE OF THE KIDNEY SIDE EFFECT HE HASN'T TAKEN THIS MEDICATION.  SHOULD HE BE TAKING THIS ?  PLEASE ADVISE.  IF SO , PLEASE SEND A NEW PRESCRIPTION TO [facility].     PATIENT HAS BEEN OUT OF THIS MEDICATION FOR ABOUT A MONTH.    Does the patient have less than a 3 day supply:  [x] Yes  [] No    Cole Christie Rep   05/24/22 10:57 EDT

## 2022-05-24 NOTE — TELEPHONE ENCOUNTER
PLEASE CONTACT PATIENT BACK.  PATIENT STATES THAT HIS BLOOD PRESSURE IS BACK TO NORMAL WITHOUT HAVING TAKEN THIS IN OVER A MONTH, DOES HE STILL NEED THIS MEDICATION AND ALSO THE JARDIANCE 10 MG?   PATIENT WAS GIVEN THE JARDIANCE BY HIS CARDIOLOGIST AND BECAUSE OF THE KIDNEY SIDE EFFECT HE HASN'T TAKEN THIS MEDICATION.  SHOULD HE BE TAKING THIS ?  PLEASE ADVISE.  IF SO , PLEASE SEND A NEW PRESCRIPTION TO [facility].

## 2022-06-13 ENCOUNTER — TELEPHONE (OUTPATIENT)
Dept: INTERNAL MEDICINE | Facility: CLINIC | Age: 62
End: 2022-06-13

## 2022-06-13 NOTE — TELEPHONE ENCOUNTER
PATIENT CALLED TO SEE WHAT KIND OF MULTIVITAMIN HE CAN TAKE TO HELP HIS IMMUNE SYSTEM.  HE STATES HIS UROLOGIST IS KEEPING HIM AWAY FROM FRUIT JUICES EXCEPT FROM APPLE JUICE. HE GETS CRYSTALS IN HIS BLADDER IF HE DRINKS TOO MUCH FRUIT JUICE. HE HAS TO DRINK A LOT OF WATER AFTERWARDS.       HE HAS RECOVERED FROM COVID  CALL BACK NUMBER [phone]

## 2022-06-17 ENCOUNTER — HOSPITAL ENCOUNTER (OUTPATIENT)
Dept: CARDIOLOGY | Facility: HOSPITAL | Age: 62
Discharge: HOME | End: 2022-06-17
Admitting: INTERNAL MEDICINE

## 2022-06-17 ENCOUNTER — OFFICE VISIT (OUTPATIENT)
Dept: CARDIOLOGY | Facility: CLINIC | Age: 62
End: 2022-06-17

## 2022-06-17 VITALS
BODY MASS INDEX: 22.01 KG/M2 | HEART RATE: 76 BPM | DIASTOLIC BLOOD PRESSURE: 80 MMHG | SYSTOLIC BLOOD PRESSURE: 122 MMHG | WEIGHT: 148.6 LBS | HEIGHT: 69 IN | OXYGEN SATURATION: 98 %

## 2022-06-17 VITALS
HEIGHT: 69 IN | WEIGHT: 145 LBS | DIASTOLIC BLOOD PRESSURE: 80 MMHG | HEART RATE: 82 BPM | SYSTOLIC BLOOD PRESSURE: 140 MMHG | BODY MASS INDEX: 21.48 KG/M2

## 2022-06-17 DIAGNOSIS — I49.3 PVC (PREMATURE VENTRICULAR CONTRACTION): ICD-10-CM

## 2022-06-17 DIAGNOSIS — I42.9 CARDIOMYOPATHY, UNSPECIFIED TYPE: ICD-10-CM

## 2022-06-17 DIAGNOSIS — R94.31 ABNORMAL ELECTROCARDIOGRAM (ECG) (EKG): ICD-10-CM

## 2022-06-17 DIAGNOSIS — I10 PRIMARY HYPERTENSION: ICD-10-CM

## 2022-06-17 DIAGNOSIS — I42.9 CARDIOMYOPATHY, UNSPECIFIED TYPE: Primary | ICD-10-CM

## 2022-06-17 LAB
AORTIC ARCH: 2.5 CM
ASCENDING AORTA: 3 CM
BH CV ECHO MEAS - ACS: 1.92 CM
BH CV ECHO MEAS - AO MAX PG: 9.4 MMHG
BH CV ECHO MEAS - AO MEAN PG: 4.6 MMHG
BH CV ECHO MEAS - AO ROOT DIAM: 3.5 CM
BH CV ECHO MEAS - AO V2 MAX: 153.4 CM/SEC
BH CV ECHO MEAS - AO V2 VTI: 27.4 CM
BH CV ECHO MEAS - AVA(I,D): 1.84 CM2
BH CV ECHO MEAS - EDV(CUBED): 120.6 ML
BH CV ECHO MEAS - EDV(MOD-SP2): 101 ML
BH CV ECHO MEAS - EDV(MOD-SP4): 119 ML
BH CV ECHO MEAS - EF(MOD-BP): 58 %
BH CV ECHO MEAS - EF(MOD-SP2): 54.5 %
BH CV ECHO MEAS - EF(MOD-SP4): 63 %
BH CV ECHO MEAS - ESV(CUBED): 31.1 ML
BH CV ECHO MEAS - ESV(MOD-SP2): 46 ML
BH CV ECHO MEAS - ESV(MOD-SP4): 44 ML
BH CV ECHO MEAS - FS: 36.3 %
BH CV ECHO MEAS - IVS/LVPW: 0.87 CM
BH CV ECHO MEAS - IVSD: 0.93 CM
BH CV ECHO MEAS - LAT PEAK E' VEL: 9 CM/SEC
BH CV ECHO MEAS - LV DIASTOLIC VOL/BSA (35-75): 65.1 CM2
BH CV ECHO MEAS - LV MASS(C)D: 179.3 GRAMS
BH CV ECHO MEAS - LV MAX PG: 2.8 MMHG
BH CV ECHO MEAS - LV MEAN PG: 1.59 MMHG
BH CV ECHO MEAS - LV SYSTOLIC VOL/BSA (12-30): 24.1 CM2
BH CV ECHO MEAS - LV V1 MAX: 84 CM/SEC
BH CV ECHO MEAS - LV V1 VTI: 16.1 CM
BH CV ECHO MEAS - LVIDD: 4.9 CM
BH CV ECHO MEAS - LVIDS: 3.1 CM
BH CV ECHO MEAS - LVOT AREA: 3.1 CM2
BH CV ECHO MEAS - LVOT DIAM: 2 CM
BH CV ECHO MEAS - LVPWD: 1.07 CM
BH CV ECHO MEAS - MED PEAK E' VEL: 8.3 CM/SEC
BH CV ECHO MEAS - MR MAX PG: 53.7 MMHG
BH CV ECHO MEAS - MR MAX VEL: 366.5 CM/SEC
BH CV ECHO MEAS - MV A DUR: 0.11 SEC
BH CV ECHO MEAS - MV A MAX VEL: 79.3 CM/SEC
BH CV ECHO MEAS - MV DEC SLOPE: 344.4 CM/SEC2
BH CV ECHO MEAS - MV DEC TIME: 0.17 MSEC
BH CV ECHO MEAS - MV E MAX VEL: 81.4 CM/SEC
BH CV ECHO MEAS - MV E/A: 1.03
BH CV ECHO MEAS - MV MAX PG: 3.5 MMHG
BH CV ECHO MEAS - MV MEAN PG: 2.08 MMHG
BH CV ECHO MEAS - MV P1/2T: 76.2 MSEC
BH CV ECHO MEAS - MV V2 VTI: 28.9 CM
BH CV ECHO MEAS - MVA(P1/2T): 2.9 CM2
BH CV ECHO MEAS - MVA(VTI): 1.74 CM2
BH CV ECHO MEAS - PA ACC TIME: 0.1 SEC
BH CV ECHO MEAS - PA PR(ACCEL): 36.2 MMHG
BH CV ECHO MEAS - PA V2 MAX: 89.6 CM/SEC
BH CV ECHO MEAS - PULM A REVS DUR: 0.11 SEC
BH CV ECHO MEAS - PULM A REVS VEL: 32.1 CM/SEC
BH CV ECHO MEAS - PULM DIAS VEL: 41.6 CM/SEC
BH CV ECHO MEAS - PULM S/D: 1.49
BH CV ECHO MEAS - PULM SYS VEL: 62.1 CM/SEC
BH CV ECHO MEAS - QP/QS: 0.77
BH CV ECHO MEAS - RAP SYSTOLE: 8 MMHG
BH CV ECHO MEAS - RV MAX PG: 2.7 MMHG
BH CV ECHO MEAS - RV V1 MAX: 82.3 CM/SEC
BH CV ECHO MEAS - RV V1 VTI: 15.1 CM
BH CV ECHO MEAS - RVOT DIAM: 1.8 CM
BH CV ECHO MEAS - RVSP: 32 MMHG
BH CV ECHO MEAS - SI(MOD-SP2): 30.1 ML/M2
BH CV ECHO MEAS - SI(MOD-SP4): 41 ML/M2
BH CV ECHO MEAS - SUP REN AO DIAM: 1.9 CM
BH CV ECHO MEAS - SV(LVOT): 50.3 ML
BH CV ECHO MEAS - SV(MOD-SP2): 55 ML
BH CV ECHO MEAS - SV(MOD-SP4): 75 ML
BH CV ECHO MEAS - SV(RVOT): 38.6 ML
BH CV ECHO MEAS - TAPSE (>1.6): 2.4 CM
BH CV ECHO MEAS - TR MAX PG: 24.4 MMHG
BH CV ECHO MEAS - TR MAX VEL: 247 CM/SEC
BH CV ECHO MEASUREMENTS AVERAGE E/E' RATIO: 9.41
BH CV XLRA - RV BASE: 2.12 CM
BH CV XLRA - RV LENGTH: 7 CM
BH CV XLRA - RV MID: 2.6 CM
BH CV XLRA - TDI S': 16.6 CM/SEC
MAXIMAL PREDICTED HEART RATE: 158 BPM
SINUS: 2.8 CM
STJ: 3.1 CM
STRESS TARGET HR: 134 BPM

## 2022-06-17 PROCEDURE — 93306 TTE W/DOPPLER COMPLETE: CPT | Performed by: INTERNAL MEDICINE

## 2022-06-17 PROCEDURE — 99214 OFFICE O/P EST MOD 30 MIN: CPT | Performed by: NURSE PRACTITIONER

## 2022-06-17 PROCEDURE — 93306 TTE W/DOPPLER COMPLETE: CPT

## 2022-06-17 NOTE — PROGRESS NOTES
"    CARDIOLOGY        Patient Name: Wm Mandel  :1960  Age: 61 y.o.  Primary Cardiologist: Kvng Craven MD  Encounter Provider:  BC Groves    Date of Service: 22          CHIEF COMPLAINT / REASON FOR OFFICE VISIT     Cardiomyopathy (3 month f/u/)      HISTORY OF PRESENT ILLNESS       HPI  Wm Mandel is a 61 y.o. male who presents today for 3-month reevaluation of cardiomyopathy.     Pt has a  history significant for pulmonary sarcoidosis, urinary retention with suprapubic catheter, cardiomyopathy, PVCs.    Patient reports has a history of uncontrolled BP, he states that now that he is monitoring and limiting sodium intake and on his current regimen that his BP has been more controlled.  He states that he is asymptomatic and denies any episodes of chest pain, shortness of breath, palpitations, lightheadedness, swelling or fatigue.  He exercises daily with a treadmill or stationary bike.  He denies limiting symptoms. He does not monitor his BP at home, but does have a BP cuff.      The following portions of the patient's history were reviewed and updated as appropriate: allergies, current medications, past family history, past medical history, past social history, past surgical history and problem list.      VITAL SIGNS     Visit Vitals  /80 (BP Location: Left arm, Patient Position: Sitting, Cuff Size: Adult)   Pulse 76   Ht 175.3 cm (69\")   Wt 67.4 kg (148 lb 9.6 oz)   SpO2 98%   BMI 21.94 kg/m²         Wt Readings from Last 3 Encounters:   22 67.4 kg (148 lb 9.6 oz)   22 65.8 kg (145 lb)   22 67.1 kg (148 lb)     Body mass index is 21.94 kg/m².      REVIEW OF SYSTEMS   Review of Systems   Constitutional: Negative for chills, fever, weight gain and weight loss.   Cardiovascular: Negative for leg swelling.   Respiratory: Negative for cough, snoring and wheezing.    Hematologic/Lymphatic: Negative for bleeding problem. Does not bruise/bleed easily. "   Skin: Negative for color change.   Musculoskeletal: Negative for falls, joint pain and myalgias.   Gastrointestinal: Negative for melena.   Genitourinary: Negative for hematuria.   Neurological: Negative for excessive daytime sleepiness.   Psychiatric/Behavioral: Negative for depression. The patient is not nervous/anxious.            PHYSICAL EXAMINATION     Constitutional:       Appearance: Normal appearance. Well-developed.   Eyes:      Conjunctiva/sclera: Conjunctivae normal.   Neck:      Vascular: No carotid bruit.   Pulmonary:      Effort: Pulmonary effort is normal.      Breath sounds: Normal breath sounds.   Cardiovascular:      Normal rate. Regular rhythm. Normal S1. Normal S2.      Murmurs: There is no murmur.      No gallop. No click. No rub.   Edema:     Peripheral edema absent.   Musculoskeletal: Normal range of motion. Skin:     General: Skin is warm and dry.   Neurological:      Mental Status: Alert and oriented to person, place, and time.      GCS: GCS eye subscore is 4. GCS verbal subscore is 5. GCS motor subscore is 6.   Psychiatric:         Speech: Speech normal.         Behavior: Behavior normal.         Thought Content: Thought content normal.         Judgment: Judgment normal.           REVIEWED DATA     Procedures    Cardiac Procedures:  1. 24-hour Holter 2/14/2022.  Frequent ectopy with 16.6% PVC burden.  2. Echocardiogram 2/24/2022.  LVEF 40-45%.  LV was mildly enlarged.  Global hypokinesis.  Borderline LVH.  Mild to moderate mitral valve regurgitation.  3. Echocardiogram 6/17/2022 PRELIM VERBAL RESULTS: LVEF 55%    Lipid Panel    Lipid Panel 2/21/22   Total Cholesterol 153   Triglycerides 63   HDL Cholesterol 46   VLDL Cholesterol 13   LDL Cholesterol  94           BUN   Date Value Ref Range Status   03/11/2022 23 8 - 27 mg/dL Final   06/01/2020 15 7 - 20 mg/dL Final     Creatinine   Date Value Ref Range Status   03/11/2022 1.41 (H) 0.76 - 1.27 mg/dL Final   06/01/2020 1.2 0.7 - 1.5 mg/dL  Final     Potassium   Date Value Ref Range Status   03/11/2022 4.6 3.5 - 5.2 mmol/L Final   06/01/2020 4.5 3.5 - 5.1 mmol/L Final     ALT (SGPT)   Date Value Ref Range Status   02/21/2022 13 0 - 44 IU/L Final     AST (SGOT)   Date Value Ref Range Status   02/21/2022 17 0 - 40 IU/L Final           ASSESSMENT & PLAN      Diagnosis Plan   1. Cardiomyopathy, unspecified type (HCC)     2. PVC (premature ventricular contraction)     3. Primary hypertension           SUMMARY/DISCUSSION  1. Cardiomyopathy.  Last echocardiogram revealed reduced LVEF.  This was thought to be from longstanding uncontrolled blood pressure.  Patient's blood pressure has now adequately been controlled for at least 3 months.  Echocardiogram performed in clinic today revealed LVEF of 55%.  This was based on preliminary verbal results from Dr. Craven.  Full report to be dictated later.  Patient denies any shortness of breath, dyspnea with exertion, orthopnea, edema.  He appears euvolemic on exam.  2. PVCs.  Patient denies heart palpitations.  He did have PVCs burden on recent monitor device.  He will continue metoprolol succinate 50 mg/day.  3. Hypertension.  Blood pressure is currently very well controlled at 122/80.  Patient is maintaining a low-sodium diet.  He will continue losartan 100 mg/day, metoprolol succinate 50 mg/day, spironolactone 25 mg/day.  4. Patient will transition care to Dr. Worthy from Dr. Craven and will follow-up in 6 months.  Call the office sooner for problems or complications.        MEDICATIONS         Discharge Medications          Accurate as of June 17, 2022 10:06 AM. If you have any questions, ask your nurse or doctor.            Continue These Medications      Instructions Start Date   econazole nitrate 1 % cream  Commonly known as: SPECTAZOLE   1 application, Topical, 2 Times Daily      fluticasone 50 MCG/ACT nasal spray  Commonly known as: FLONASE   2 sprays, Nasal, As Needed      losartan 100 MG  tablet  Commonly known as: COZAAR   100 mg, Oral, Daily      metoprolol succinate XL 50 MG 24 hr tablet  Commonly known as: Toprol XL   50 mg, Oral, Daily      spironolactone 25 MG tablet  Commonly known as: Aldactone   25 mg, Oral, Daily      tamsulosin 0.4 MG capsule 24 hr capsule  Commonly known as: FLOMAX   tamsulosin 0.4 mg capsule   TK 1 C PO QD      Tolnaftate 1 % gel   Apply externally      vitamin D 1.25 MG (66822 UT) capsule capsule  Commonly known as: ERGOCALCIFEROL   50,000 Units, Oral, Weekly         Stop These Medications    empagliflozin 10 MG tablet tablet  Commonly known as: Jardiance  Stopped by: BC Groves                **Dragon Disclaimer:   Much of this encounter note is an electronic transcription/translation of spoken language to printed text. The electronic translation of spoken language may permit erroneous, or at times, nonsensical words or phrases to be inadvertently transcribed. Although I have reviewed the note for such errors, some may still exist.

## 2022-07-28 ENCOUNTER — TELEPHONE (OUTPATIENT)
Dept: INTERNAL MEDICINE | Facility: CLINIC | Age: 62
End: 2022-07-28

## 2022-07-28 NOTE — TELEPHONE ENCOUNTER
PATIENT CALLED WANTING TO KNOW WHEN HE IS NEEDING THE PNEUMONIA VACCINE    PLEASE CALL AND ADVISE  Record, Wm MESA (Self) [phone] (H)

## 2022-09-11 DIAGNOSIS — R93.1 DECREASED CARDIAC EJECTION FRACTION: ICD-10-CM

## 2022-09-11 DIAGNOSIS — I10 BENIGN ESSENTIAL HYPERTENSION: ICD-10-CM

## 2022-09-12 RX ORDER — METOPROLOL SUCCINATE 50 MG/1
TABLET, EXTENDED RELEASE ORAL
Qty: 90 TABLET | Refills: 3 | Status: SHIPPED | OUTPATIENT
Start: 2022-09-12 | End: 2022-09-15 | Stop reason: SDUPTHER

## 2022-09-12 RX ORDER — SPIRONOLACTONE 25 MG/1
TABLET ORAL
Qty: 90 TABLET | Refills: 3 | Status: SHIPPED | OUTPATIENT
Start: 2022-09-12 | End: 2022-09-15 | Stop reason: SDUPTHER

## 2022-09-15 ENCOUNTER — OFFICE VISIT (OUTPATIENT)
Dept: INTERNAL MEDICINE | Facility: CLINIC | Age: 62
End: 2022-09-15

## 2022-09-15 VITALS
SYSTOLIC BLOOD PRESSURE: 122 MMHG | WEIGHT: 139 LBS | TEMPERATURE: 97.7 F | BODY MASS INDEX: 20.59 KG/M2 | HEIGHT: 69 IN | DIASTOLIC BLOOD PRESSURE: 76 MMHG

## 2022-09-15 DIAGNOSIS — Z00.00 WELCOME TO MEDICARE PREVENTIVE VISIT: Primary | ICD-10-CM

## 2022-09-15 DIAGNOSIS — N18.31 STAGE 3A CHRONIC KIDNEY DISEASE (CKD): ICD-10-CM

## 2022-09-15 DIAGNOSIS — I10 BENIGN ESSENTIAL HYPERTENSION: Chronic | ICD-10-CM

## 2022-09-15 DIAGNOSIS — E55.9 VITAMIN D DEFICIENCY: ICD-10-CM

## 2022-09-15 DIAGNOSIS — R93.1 DECREASED CARDIAC EJECTION FRACTION: ICD-10-CM

## 2022-09-15 PROCEDURE — G0402 INITIAL PREVENTIVE EXAM: HCPCS | Performed by: INTERNAL MEDICINE

## 2022-09-15 PROCEDURE — 1160F RVW MEDS BY RX/DR IN RCRD: CPT | Performed by: INTERNAL MEDICINE

## 2022-09-15 PROCEDURE — 1170F FXNL STATUS ASSESSED: CPT | Performed by: INTERNAL MEDICINE

## 2022-09-15 PROCEDURE — G0008 ADMIN INFLUENZA VIRUS VAC: HCPCS | Performed by: INTERNAL MEDICINE

## 2022-09-15 PROCEDURE — 90686 IIV4 VACC NO PRSV 0.5 ML IM: CPT | Performed by: INTERNAL MEDICINE

## 2022-09-15 RX ORDER — LOSARTAN POTASSIUM 100 MG/1
100 TABLET ORAL DAILY
Qty: 90 TABLET | Refills: 1 | Status: SHIPPED | OUTPATIENT
Start: 2022-09-15

## 2022-09-15 RX ORDER — SPIRONOLACTONE 25 MG/1
25 TABLET ORAL DAILY
Qty: 90 TABLET | Refills: 3 | Status: SHIPPED | OUTPATIENT
Start: 2022-09-15

## 2022-09-15 RX ORDER — METOPROLOL SUCCINATE 50 MG/1
50 TABLET, EXTENDED RELEASE ORAL DAILY
Qty: 90 TABLET | Refills: 3 | Status: SHIPPED | OUTPATIENT
Start: 2022-09-15

## 2022-09-15 NOTE — PROGRESS NOTES
The ABCs of the Annual Wellness Visit  [facility]    Chief Complaint   Patient presents with   • Medicare Wellness-subsequent     Subjective {   History of Present Illness:  Wm Mandel is a 61 y.o. male who presents for a  Welcome to Medicare Visit.  He denies chest pain or dyspnea. He has lost wt on purpose.  He has not been checking his BP.  The following portions of the patient's history were reviewed and   updated as appropriate: allergies, current medications, past family history, past medical history, past social history, past surgical history and problem list.     Compared to one year ago, the patient feels his physical   health is better.    Compared to one year ago, the patient feels his mental   health is the same.    Recent Hospitalizations:  He was not admitted to the hospital during the last year.       Current Medical Providers:  Patient Care Team:  Dale Andrade MD as PCP - General (Internal Medicine)    Outpatient Medications Prior to Visit   Medication Sig Dispense Refill   • Acetaminophen (TYLENOL EXTRA STRENGTH PO) Take  by mouth.     • econazole nitrate (SPECTAZOLE) 1 % cream Apply 1 application topically to the appropriate area as directed 2 (Two) Times a Day. 30 g 1   • fluticasone (FLONASE) 50 MCG/ACT nasal spray 2 sprays into the nostril(s) as directed by provider As Needed.     • tamsulosin (FLOMAX) 0.4 MG capsule 24 hr capsule tamsulosin 0.4 mg capsule   TK 1 C PO QD     • Tolnaftate 1 % gel Apply  topically.     • losartan (COZAAR) 100 MG tablet Take 1 tablet by mouth Daily. 90 tablet 1   • metoprolol succinate XL (TOPROL-XL) 50 MG 24 hr tablet TAKE 1 TABLET BY MOUTH EVERY DAY 90 tablet 3   • spironolactone (ALDACTONE) 25 MG tablet TAKE 1 TABLET BY MOUTH EVERY DAY 90 tablet 3   • vitamin D (ERGOCALCIFEROL) 1.25 MG (16583 UT) capsule capsule Take 1 capsule by mouth 1 (One) Time Per Week. 12 capsule 3     No facility-administered medications prior to visit.       No  "opioid medication identified on active medication list. I have reviewed chart for other potential  high risk medication/s and harmful drug interactions in the elderly.          Aspirin is not on active medication list.  Aspirin use is not indicated based on review of current medical condition/s. Risk of harm outweighs potential benefits.  .    Patient Active Problem List   Diagnosis   • Pulmonary sarcoidosis (HCC)   • Onychomycosis   • Benign essential hypertension   • Benign prostatic hyperplasia   • Decreased cardiac ejection fraction   • Vitamin D deficiency   • Stage 3a chronic kidney disease (CKD) (HCC)     Advance Care Planning  Advance Directive is not on file.  ACP discussion was held with the patient during this visit. Patient does not have an advance directive, information provided.          Objective      Vitals:    09/15/22 1332   BP: 122/76   Temp: 97.7 °F (36.5 °C)   Weight: 63 kg (139 lb)   Height: 175.3 cm (69.02\")     Estimated body mass index is 20.52 kg/m² as calculated from the following:    Height as of this encounter: 175.3 cm (69.02\").    Weight as of this encounter: 63 kg (139 lb).    BMI is within normal parameters. No other follow-up for BMI required.      Does the patient have evidence of cognitive impairment? No    Physical Exam  Vitals reviewed.   Constitutional:       Appearance: He is well-developed.   HENT:      Head: Normocephalic and atraumatic.   Neck:      Vascular: No carotid bruit.   Cardiovascular:      Rate and Rhythm: Normal rate and regular rhythm.      Heart sounds: Normal heart sounds, S1 normal and S2 normal.   Pulmonary:      Effort: Pulmonary effort is normal.      Breath sounds: Normal breath sounds.   Skin:     General: Skin is warm.   Neurological:      Mental Status: He is alert.   Psychiatric:         Behavior: Behavior normal.              Procedures       HEALTH RISK ASSESSMENT    Smoking Status:  Social History     Tobacco Use   Smoking Status Never Smoker "   Smokeless Tobacco Never Used   Tobacco Comment    caffeine use- denies     Alcohol Consumption:  Social History     Substance and Sexual Activity   Alcohol Use Never       Fall Risk Screen:    ISATUADI Fall Risk Assessment was completed, and patient is at LOW risk for falls.Assessment completed on:9/15/2022    Depression Screen:   PHQ-2/PHQ-9 Depression Screening 9/15/2022   Little Interest or Pleasure in Doing Things 0-->not at all   Feeling Down, Depressed or Hopeless 0-->not at all   PHQ-9: Brief Depression Severity Measure Score 0       Health Habits and Functional and Cognitive Screening:  Functional & Cognitive Status 9/15/2022   Do you have difficulty preparing food and eating? No   Do you have difficulty bathing yourself, getting dressed or grooming yourself? No   Do you have difficulty using the toilet? No   Do you have difficulty moving around from place to place? No   Do you have trouble with steps or getting out of a bed or a chair? No   Current Diet Well Balanced Diet   Dental Exam Up to date   Eye Exam Unknown   Exercise (times per week) 0 times per week   Current Exercises Include No Regular Exercise   Do you need help using the phone?  No   Are you deaf or do you have serious difficulty hearing?  No   Do you need help with transportation? No   Do you need help shopping? No   Do you need help preparing meals?  No   Do you need help with housework?  No   Do you need help with laundry? No   Do you need help taking your medications? No   Do you need help managing money? No   Do you ever drive or ride in a car without wearing a seat belt? No   Have you felt unusual stress, anger or loneliness in the last month? No   Who do you live with? Alone   If you need help, do you have trouble finding someone available to you? No   Have you been bothered in the last four weeks by sexual problems? No   Do you have difficulty concentrating, remembering or making decisions? No       Visual Acuity:     Visual Acuity  Screening    Right eye Left eye Both eyes   Without correction:      With correction: 20/20 20/20 20/20       Age-appropriate Screening Schedule:  Refer to the list below for future screening recommendations based on patient's age, sex and/or medical conditions. Orders for these recommended tests are listed in the plan section. The patient has been provided with a written plan.    Health Maintenance   Topic Date Due   • INFLUENZA VACCINE  10/01/2022   • LIPID PANEL  02/21/2023   • TDAP/TD VACCINES (2 - Td or Tdap) 09/11/2030          Assessment & Plan   CMS Preventative Services Quick Reference  Risk Factors Identified During Encounter  Immunizations Discussed/Encouraged (specific Immunizations; Influenza  The above risks/problems have been discussed with the patient.  Pertinent information has been shared with the patient in the After Visit Summary.  Follow up plans and orders are seen below in the Assessment/Plan Section.    Diagnoses and all orders for this visit:    1. Welcome to Medicare preventive visit (Primary)    2. Vitamin D deficiency  Comments:  Stop weekly and start vit D3   2,000 units daily    3. Benign essential hypertension  -     Lipid Panel With / Chol / HDL Ratio; Future  -     losartan (COZAAR) 100 MG tablet; Take 1 tablet by mouth Daily.  Dispense: 90 tablet; Refill: 1  -     metoprolol succinate XL (TOPROL-XL) 50 MG 24 hr tablet; Take 1 tablet by mouth Daily.  Dispense: 90 tablet; Refill: 3  -     spironolactone (ALDACTONE) 25 MG tablet; Take 1 tablet by mouth Daily.  Dispense: 90 tablet; Refill: 3    4. Stage 3a chronic kidney disease (CKD) (HCC)  Comments:  Likely from ARB and diuretic. Avoid NSAIDS  Orders:  -     Comprehensive Metabolic Panel; Future    5. Decreased cardiac ejection fraction  -     metoprolol succinate XL (TOPROL-XL) 50 MG 24 hr tablet; Take 1 tablet by mouth Daily.  Dispense: 90 tablet; Refill: 3  -     spironolactone (ALDACTONE) 25 MG tablet; Take 1 tablet by mouth  Daily.  Dispense: 90 tablet; Refill: 3    Other orders  -     FluLaval/Fluzone >6 mos (3095-4903)        Follow Up:   Return in about 6 months (around 3/15/2023), or 30 minutes, for Lab Before FUP.     An After Visit Summary and PPPS were made available to the patient.                   Flu shot today.Reviewed labs. He sees cards in December. BP is good. He has lost wt on purpose. Discussed exercising 150 minutes per week.

## 2023-03-09 ENCOUNTER — TELEPHONE (OUTPATIENT)
Dept: INTERNAL MEDICINE | Facility: CLINIC | Age: 63
End: 2023-03-09
Payer: MEDICARE

## 2023-03-09 ENCOUNTER — LAB (OUTPATIENT)
Dept: LAB | Facility: HOSPITAL | Age: 63
End: 2023-03-09
Payer: MEDICARE

## 2023-03-09 ENCOUNTER — TRANSCRIBE ORDERS (OUTPATIENT)
Dept: ADMINISTRATIVE | Facility: HOSPITAL | Age: 63
End: 2023-03-09
Payer: MEDICARE

## 2023-03-09 ENCOUNTER — HOSPITAL ENCOUNTER (OUTPATIENT)
Dept: CARDIOLOGY | Facility: HOSPITAL | Age: 63
Discharge: HOME | End: 2023-03-09
Payer: MEDICARE

## 2023-03-09 DIAGNOSIS — Z01.818 PRE-OP EXAM: Primary | ICD-10-CM

## 2023-03-09 DIAGNOSIS — Z01.818 PRE-OP EXAM: ICD-10-CM

## 2023-03-09 LAB
ANION GAP SERPL CALCULATED.3IONS-SCNC: 7.7 MMOL/L (ref 5–15)
BASOPHILS # BLD AUTO: 0.05 10*3/MM3 (ref 0–0.2)
BASOPHILS NFR BLD AUTO: 0.8 % (ref 0–1.5)
BUN SERPL-MCNC: 18 MG/DL (ref 8–23)
BUN/CREAT SERPL: 12.1 (ref 7–25)
CALCIUM SPEC-SCNC: 9.1 MG/DL (ref 8.6–10.5)
CHLORIDE SERPL-SCNC: 105 MMOL/L (ref 98–107)
CO2 SERPL-SCNC: 27.3 MMOL/L (ref 22–29)
CREAT SERPL-MCNC: 1.49 MG/DL (ref 0.76–1.27)
DEPRECATED RDW RBC AUTO: 40.2 FL (ref 37–54)
EGFRCR SERPLBLD CKD-EPI 2021: 52.4 ML/MIN/1.73
EOSINOPHIL # BLD AUTO: 0.37 10*3/MM3 (ref 0–0.4)
EOSINOPHIL NFR BLD AUTO: 5.7 % (ref 0.3–6.2)
ERYTHROCYTE [DISTWIDTH] IN BLOOD BY AUTOMATED COUNT: 13.1 % (ref 12.3–15.4)
GLUCOSE SERPL-MCNC: 87 MG/DL (ref 65–99)
HCT VFR BLD AUTO: 37.1 % (ref 37.5–51)
HGB BLD-MCNC: 12.5 G/DL (ref 13–17.7)
IMM GRANULOCYTES # BLD AUTO: 0.02 10*3/MM3 (ref 0–0.05)
IMM GRANULOCYTES NFR BLD AUTO: 0.3 % (ref 0–0.5)
LYMPHOCYTES # BLD AUTO: 1.47 10*3/MM3 (ref 0.7–3.1)
LYMPHOCYTES NFR BLD AUTO: 22.7 % (ref 19.6–45.3)
MCH RBC QN AUTO: 28.7 PG (ref 26.6–33)
MCHC RBC AUTO-ENTMCNC: 33.7 G/DL (ref 31.5–35.7)
MCV RBC AUTO: 85.1 FL (ref 79–97)
MONOCYTES # BLD AUTO: 0.43 10*3/MM3 (ref 0.1–0.9)
MONOCYTES NFR BLD AUTO: 6.6 % (ref 5–12)
NEUTROPHILS NFR BLD AUTO: 4.14 10*3/MM3 (ref 1.7–7)
NEUTROPHILS NFR BLD AUTO: 63.9 % (ref 42.7–76)
NRBC BLD AUTO-RTO: 0 /100 WBC (ref 0–0.2)
PLATELET # BLD AUTO: 294 10*3/MM3 (ref 140–450)
PMV BLD AUTO: 9.5 FL (ref 6–12)
POTASSIUM SERPL-SCNC: 4.6 MMOL/L (ref 3.5–5.2)
QT INTERVAL: 378 MS
RBC # BLD AUTO: 4.36 10*6/MM3 (ref 4.14–5.8)
SODIUM SERPL-SCNC: 140 MMOL/L (ref 136–145)
WBC NRBC COR # BLD: 6.48 10*3/MM3 (ref 3.4–10.8)

## 2023-03-09 PROCEDURE — 93010 ELECTROCARDIOGRAM REPORT: CPT | Performed by: INTERNAL MEDICINE

## 2023-03-09 PROCEDURE — 85025 COMPLETE CBC W/AUTO DIFF WBC: CPT

## 2023-03-09 PROCEDURE — 36415 COLL VENOUS BLD VENIPUNCTURE: CPT

## 2023-03-09 PROCEDURE — 80048 BASIC METABOLIC PNL TOTAL CA: CPT

## 2023-03-09 PROCEDURE — 93005 ELECTROCARDIOGRAM TRACING: CPT | Performed by: ANESTHESIOLOGY

## 2023-03-09 NOTE — TELEPHONE ENCOUNTER
Spoke with patient.  He is having the EKG and blood work done.  Patient states he does not need a pre-op appointment for surgery clearance.

## 2023-03-09 NOTE — TELEPHONE ENCOUNTER
Let him know I got an order for labs and EKG from Dr. Sweet. I am not sure why they sent to me. Find out

## 2023-03-10 DIAGNOSIS — N18.31 STAGE 3A CHRONIC KIDNEY DISEASE (CKD): ICD-10-CM

## 2023-03-10 DIAGNOSIS — I10 BENIGN ESSENTIAL HYPERTENSION: Chronic | ICD-10-CM

## 2023-03-10 LAB
ALBUMIN SERPL-MCNC: 4.4 G/DL (ref 3.5–5.2)
ALBUMIN/GLOB SERPL: 1.8 G/DL
ALP SERPL-CCNC: 55 U/L (ref 39–117)
ALT SERPL-CCNC: 15 U/L (ref 1–41)
AST SERPL-CCNC: 11 U/L (ref 1–40)
BILIRUB SERPL-MCNC: 0.2 MG/DL (ref 0–1.2)
BUN SERPL-MCNC: 22 MG/DL (ref 8–23)
BUN/CREAT SERPL: 15 (ref 7–25)
CALCIUM SERPL-MCNC: 9.3 MG/DL (ref 8.6–10.5)
CHLORIDE SERPL-SCNC: 105 MMOL/L (ref 98–107)
CHOLEST SERPL-MCNC: 179 MG/DL (ref 0–200)
CHOLEST/HDLC SERPL: 3.89 {RATIO}
CO2 SERPL-SCNC: 24.2 MMOL/L (ref 22–29)
CREAT SERPL-MCNC: 1.47 MG/DL (ref 0.76–1.27)
EGFRCR SERPLBLD CKD-EPI 2021: 53.3 ML/MIN/1.73
GLOBULIN SER CALC-MCNC: 2.4 GM/DL
GLUCOSE SERPL-MCNC: 93 MG/DL (ref 65–99)
HDLC SERPL-MCNC: 46 MG/DL (ref 40–60)
LDLC SERPL CALC-MCNC: 119 MG/DL (ref 0–100)
POTASSIUM SERPL-SCNC: 4.7 MMOL/L (ref 3.5–5.2)
PROT SERPL-MCNC: 6.8 G/DL (ref 6–8.5)
SODIUM SERPL-SCNC: 138 MMOL/L (ref 136–145)
TRIGL SERPL-MCNC: 73 MG/DL (ref 0–150)
VLDLC SERPL CALC-MCNC: 14 MG/DL (ref 5–40)

## 2023-03-16 ENCOUNTER — TELEPHONE (OUTPATIENT)
Dept: INTERNAL MEDICINE | Facility: CLINIC | Age: 63
End: 2023-03-16

## 2023-03-16 ENCOUNTER — OFFICE VISIT (OUTPATIENT)
Dept: INTERNAL MEDICINE | Facility: CLINIC | Age: 63
End: 2023-03-16
Payer: MEDICARE

## 2023-03-16 DIAGNOSIS — E78.49 OTHER HYPERLIPIDEMIA: ICD-10-CM

## 2023-03-16 DIAGNOSIS — N18.31 STAGE 3A CHRONIC KIDNEY DISEASE (CKD): ICD-10-CM

## 2023-03-16 DIAGNOSIS — I10 BENIGN ESSENTIAL HYPERTENSION: Primary | Chronic | ICD-10-CM

## 2023-03-16 PROCEDURE — 1159F MED LIST DOCD IN RCRD: CPT | Performed by: INTERNAL MEDICINE

## 2023-03-16 PROCEDURE — 99422 OL DIG E/M SVC 11-20 MIN: CPT | Performed by: INTERNAL MEDICINE

## 2023-03-16 RX ORDER — ATORVASTATIN CALCIUM 20 MG/1
20 TABLET, FILM COATED ORAL DAILY
Qty: 90 TABLET | Refills: 1 | Status: SHIPPED | OUTPATIENT
Start: 2023-03-16

## 2023-03-16 NOTE — TELEPHONE ENCOUNTER
Caller: Wm Mandel    Relationship: Self    Best call back number: [phone]       What was the call regarding:     PATIENT CAN NOT MAKE IT TO THE APPOINTMENT AS SCHEDULED.  HE HAD BACK SURGERY AND CAN NOT DRIVE AND DOES NOT HAVE TRANSPORTATION.  HE STATED THAT YOU WOULD NOT LET HIM RESCHEDULE HIS APPOINTMENT.        Do you require a callback: YES

## 2023-03-16 NOTE — TELEPHONE ENCOUNTER
Caller: Wm Mandel    Relationship: Self    Best call back number: [phone]     What was the call regarding: PATIENT STATES THAT HE WILL NOT BE ABLE TO HAVE TRANSPORTATION FOR HIS APPOINTMENT TODAY. PATIENT STATES THAT HE HAD SURGERY AND IS NOT ABLE TO DO ANY PHYSICAL MOVEMENT OR DRIVING. PATIENT WANTED TO KNOW IF THERE WAS ANYTHING WE COULD DO FOR HIM. PLEASE ADVISE PATIENT ASAP. HUB COULD NOT WARM TRANSFER TO THE OFFICE.     Do you require a callback: YES

## 2023-03-16 NOTE — PROGRESS NOTES
Subjective        Chief Complaint   Patient presents with   • phone visit   • Hypertension           Wm Mandel is a 62 y.o. male who presents for    Patient Active Problem List   Diagnosis   • Pulmonary sarcoidosis (HCC)   • Onychomycosis   • Benign essential hypertension   • Benign prostatic hyperplasia   • Decreased cardiac ejection fraction   • Vitamin D deficiency   • Stage 3a chronic kidney disease (CKD) (HCC)   • Other hyperlipidemia       History of Present Illness     He had a procedure for interstim on Tuesday with Dr. Sweet. He denies melena, hematochezia, chest pain or dyspnea. He has been checking his BP and it has been around 100/?? We are doing a phone call since he had surgery this week and is not supposed to drive.  Allergies   Allergen Reactions   • Shingrix [Zoster Vac Recomb Adjuvanted] Rash       Current Outpatient Medications on File Prior to Visit   Medication Sig Dispense Refill   • Acetaminophen (TYLENOL EXTRA STRENGTH PO) Take  by mouth.     • econazole nitrate (SPECTAZOLE) 1 % cream Apply 1 application topically to the appropriate area as directed 2 (Two) Times a Day. 30 g 1   • fluticasone (FLONASE) 50 MCG/ACT nasal spray 2 sprays into the nostril(s) as directed by provider As Needed.     • losartan (COZAAR) 100 MG tablet Take 1 tablet by mouth Daily. 90 tablet 1   • metoprolol succinate XL (TOPROL-XL) 50 MG 24 hr tablet Take 1 tablet by mouth Daily. 90 tablet 3   • spironolactone (ALDACTONE) 25 MG tablet Take 1 tablet by mouth Daily. 90 tablet 3   • tamsulosin (FLOMAX) 0.4 MG capsule 24 hr capsule tamsulosin 0.4 mg capsule   TK 1 C PO QD     • Tolnaftate 1 % gel Apply  topically.       No current facility-administered medications on file prior to visit.       Past Medical History:   Diagnosis Date   • Hyperlipidemia    • Leukocytoclastic vasculitis (HCC)    • Tremor     seen at        Past Surgical History:   Procedure Laterality Date   • COLONOSCOPY  10/13/2020   • LUNG SURGERY      • PROSTATE SURGERY     • SUPRAPUBIC CATHETER INSERTION     Mode of Visit: phone call  Location of patient: home  Location of provider: [facility] [place] [facility]  You have chosen to receive care through a telehealth visit.  Does the patient consent to use a video/audio connection for your medical care today? Yes  The visit included audio and video interaction. No technical issues occurred during this visit.     Family History   Problem Relation Age of Onset   • Cancer Mother    • Other Father         gunshot       Social History     Socioeconomic History   • Marital status: Single   Tobacco Use   • Smoking status: Never   • Smokeless tobacco: Never   • Tobacco comments:     caffeine use- denies   Substance and Sexual Activity   • Alcohol use: Never   • Drug use: Never   • Sexual activity: Yes     Birth control/protection: Condom           The following portions of the patient's history were reviewed and updated as appropriate: problem list, allergies, current medications, past medical history, past family history, past social history and past surgical history.    Review of Systems    Immunization History   Administered Date(s) Administered   • COVID-19 (PFIZER) PURPLE CAP 04/01/2021, 04/22/2021, 11/10/2021   • Flu Vaccine Quad PF >36MO 09/11/2020   • Flu Vaccine Split Quad 02/04/2020   • FluLaval/Fluzone >6mos 09/15/2022   • Influenza, Unspecified 10/22/2021   • Tdap 09/11/2020       Objective   There were no vitals filed for this visit.  There is no height or weight on file to calculate BMI.  Physical Exam  Neurological:      Mental Status: He is alert.   Psychiatric:         Mood and Affect: Mood normal.         Behavior: Behavior normal.     The 10-year ASCVD risk score (Mily DANGELO, et al., 2019) is: 13.7%    Values used to calculate the score:      Age: 62 years      Sex: Male      Is Non- : Yes      Diabetic: No      Tobacco smoker: No      Systolic Blood Pressure: 122 mmHg      Is BP treated: Yes       HDL Cholesterol: 46 mg/dL      Total Cholesterol: 179 mg/dL    Procedures    Assessment & Plan   Diagnoses and all orders for this visit:    1. Benign essential hypertension (Primary)  -     CBC & Differential; Future    2. Stage 3a chronic kidney disease (CKD) (HCC)  -     Comprehensive Metabolic Panel; Future    3. Other hyperlipidemia  -     atorvastatin (LIPITOR) 20 MG tablet; Take 1 tablet by mouth Daily.  Dispense: 90 tablet; Refill: 1  -     Lipid Panel With / Chol / HDL Ratio; Future             Reviewed cmp, flp and cbc. Add lipitor. Recheck CBC in follow up.    17 minutes spend today on phone call.    Return in about 2 months (around 5/16/2023), or 30 minutes, for Lab Before FUP.

## 2023-03-16 NOTE — TELEPHONE ENCOUNTER
Patient states he does not know how to use Mychart but can do a Telephone visit or  He can reschedule appointment to next month. Please advise [phone]

## 2023-03-16 NOTE — TELEPHONE ENCOUNTER
Patient had back surgery and is wanting to know if he could change his next appointment to a virtual visit, please advise?  [phone].

## 2023-04-03 ENCOUNTER — TELEPHONE (OUTPATIENT)
Dept: INTERNAL MEDICINE | Facility: CLINIC | Age: 63
End: 2023-04-03
Payer: MEDICARE

## 2023-04-04 ENCOUNTER — TELEPHONE (OUTPATIENT)
Dept: INTERNAL MEDICINE | Facility: CLINIC | Age: 63
End: 2023-04-04
Payer: MEDICARE

## 2023-05-19 ENCOUNTER — OFFICE VISIT (OUTPATIENT)
Dept: INTERNAL MEDICINE | Facility: CLINIC | Age: 63
End: 2023-05-19
Payer: MEDICARE

## 2023-05-19 VITALS
TEMPERATURE: 97.1 F | HEIGHT: 69 IN | WEIGHT: 149 LBS | HEART RATE: 60 BPM | BODY MASS INDEX: 22.07 KG/M2 | DIASTOLIC BLOOD PRESSURE: 80 MMHG | SYSTOLIC BLOOD PRESSURE: 150 MMHG

## 2023-05-19 DIAGNOSIS — R93.1 DECREASED CARDIAC EJECTION FRACTION: ICD-10-CM

## 2023-05-19 DIAGNOSIS — I10 BENIGN ESSENTIAL HYPERTENSION: Primary | Chronic | ICD-10-CM

## 2023-05-19 DIAGNOSIS — D64.9 ANEMIA, UNSPECIFIED TYPE: ICD-10-CM

## 2023-05-19 DIAGNOSIS — I10 BENIGN ESSENTIAL HYPERTENSION: Chronic | ICD-10-CM

## 2023-05-19 DIAGNOSIS — E78.49 OTHER HYPERLIPIDEMIA: Chronic | ICD-10-CM

## 2023-05-19 DIAGNOSIS — R93.1 DECREASED CARDIAC EJECTION FRACTION: Chronic | ICD-10-CM

## 2023-05-19 DIAGNOSIS — N18.31 STAGE 3A CHRONIC KIDNEY DISEASE (CKD): ICD-10-CM

## 2023-05-19 RX ORDER — METOPROLOL SUCCINATE 50 MG/1
50 TABLET, EXTENDED RELEASE ORAL DAILY
Qty: 90 TABLET | Refills: 3 | Status: SHIPPED | OUTPATIENT
Start: 2023-05-19

## 2023-05-19 RX ORDER — LOSARTAN POTASSIUM 100 MG/1
100 TABLET ORAL DAILY
Qty: 90 TABLET | Refills: 1 | Status: SHIPPED | OUTPATIENT
Start: 2023-05-19

## 2023-05-19 RX ORDER — SPIRONOLACTONE 25 MG/1
25 TABLET ORAL DAILY
Qty: 90 TABLET | Refills: 3 | Status: SHIPPED | OUTPATIENT
Start: 2023-05-19

## 2023-05-19 RX ORDER — ATORVASTATIN CALCIUM 20 MG/1
20 TABLET, FILM COATED ORAL DAILY
Qty: 90 TABLET | Refills: 1 | Status: SHIPPED | OUTPATIENT
Start: 2023-05-19

## 2023-05-19 NOTE — PROGRESS NOTES
Subjective        Chief Complaint   Patient presents with   • Hypertension           Wm Mandel is a 62 y.o. male who presents for    Patient Active Problem List   Diagnosis   • Pulmonary sarcoidosis   • Onychomycosis   • Benign essential hypertension   • Benign prostatic hyperplasia   • Decreased cardiac ejection fraction   • Vitamin D deficiency   • Stage 3a chronic kidney disease (CKD)   • Other hyperlipidemia       History of Present Illness     He denies melena, hematochezia, chest pain, dyspnea or pedal edema. He has been checking his BP but forgot the numbers. He has been seeing the urologist and Dr. Sweet has been working with him on his Interstim; the device is helping his urination. He did not start the Lipitor. He has been out of his BP meds for a month.   Allergies   Allergen Reactions   • Shingrix [Zoster Vac Recomb Adjuvanted] Rash       Current Outpatient Medications on File Prior to Visit   Medication Sig Dispense Refill   • Acetaminophen (TYLENOL EXTRA STRENGTH PO) Take  by mouth.     • econazole nitrate (SPECTAZOLE) 1 % cream Apply 1 application topically to the appropriate area as directed 2 (Two) Times a Day. 30 g 1   • tamsulosin (FLOMAX) 0.4 MG capsule 24 hr capsule tamsulosin 0.4 mg capsule   TK 1 C PO QD     • Tolnaftate 1 % gel Apply  topically.     • [DISCONTINUED] atorvastatin (LIPITOR) 20 MG tablet Take 1 tablet by mouth Daily. 90 tablet 1   • [DISCONTINUED] losartan (COZAAR) 100 MG tablet Take 1 tablet by mouth Daily. 90 tablet 1   • [DISCONTINUED] metoprolol succinate XL (TOPROL-XL) 50 MG 24 hr tablet Take 1 tablet by mouth Daily. 90 tablet 3   • [DISCONTINUED] spironolactone (ALDACTONE) 25 MG tablet Take 1 tablet by mouth Daily. 90 tablet 3   • [DISCONTINUED] fluticasone (FLONASE) 50 MCG/ACT nasal spray 2 sprays into the nostril(s) as directed by provider As Needed. (Patient not taking: Reported on 5/19/2023)       No current facility-administered medications on file prior to  "visit.       Past Medical History:   Diagnosis Date   • Leukocytoclastic vasculitis    • Tremor     seen at        Past Surgical History:   Procedure Laterality Date   • COLONOSCOPY  10/13/2020   • INTERSTIM PLACEMENT  2023   • LUNG SURGERY     • PROSTATE SURGERY     • SUPRAPUBIC CATHETER INSERTION         Family History   Problem Relation Age of Onset   • Cancer Mother    • Other Father         gunshot       Social History     Socioeconomic History   • Marital status: Single   Tobacco Use   • Smoking status: Never   • Smokeless tobacco: Never   • Tobacco comments:     caffeine use- denies   Substance and Sexual Activity   • Alcohol use: Never   • Drug use: Never   • Sexual activity: Yes     Birth control/protection: Condom           The following portions of the patient's history were reviewed and updated as appropriate: problem list, allergies, current medications, past medical history, past family history, past social history and past surgical history.    Review of Systems    Immunization History   Administered Date(s) Administered   • COVID-19 (PFIZER) Purple Cap Monovalent 04/01/2021, 04/22/2021, 11/10/2021   • Flu Vaccine Quad PF >36MO 09/11/2020   • Flu Vaccine Split Quad 02/04/2020   • FluLaval/Fluzone >6mos 09/15/2022   • Influenza, Unspecified 10/22/2021   • Tdap 09/11/2020       Objective   Vitals:    05/19/23 0937   BP: 150/80   Pulse: 60   Temp: 97.1 °F (36.2 °C)   Weight: 67.6 kg (149 lb)   Height: 175.3 cm (69.02\")     Body mass index is 21.99 kg/m².  Physical Exam  Vitals reviewed.   Constitutional:       Appearance: He is well-developed.   HENT:      Head: Normocephalic and atraumatic.   Cardiovascular:      Rate and Rhythm: Normal rate and regular rhythm.      Heart sounds: Normal heart sounds, S1 normal and S2 normal.      Comments: No edema  Pulmonary:      Effort: Pulmonary effort is normal.      Breath sounds: Normal breath sounds.   Skin:     General: Skin is warm.   Neurological:      " Mental Status: He is alert.   Psychiatric:         Behavior: Behavior normal.         Procedures    Assessment & Plan   Diagnoses and all orders for this visit:    1. Benign essential hypertension (Primary)  Comments:  He ran out of his meds a month ago. Discussed that non compliance will shorten his life expectancy.   Orders:  -     metoprolol succinate XL (TOPROL-XL) 50 MG 24 hr tablet; Take 1 tablet by mouth Daily.  Dispense: 90 tablet; Refill: 3  -     spironolactone (ALDACTONE) 25 MG tablet; Take 1 tablet by mouth Daily.  Dispense: 90 tablet; Refill: 3  -     losartan (COZAAR) 100 MG tablet; Take 1 tablet by mouth Daily.  Dispense: 90 tablet; Refill: 1    2. Decreased cardiac ejection fraction  Comments:  On ACE, Beta blocker and diuretic. Discussed compliance and how these meds actually helped his BP and EF.  Orders:  -     metoprolol succinate XL (TOPROL-XL) 50 MG 24 hr tablet; Take 1 tablet by mouth Daily.  Dispense: 90 tablet; Refill: 3  -     spironolactone (ALDACTONE) 25 MG tablet; Take 1 tablet by mouth Daily.  Dispense: 90 tablet; Refill: 3    3. Other hyperlipidemia  Comments:  Start lipitor.  Orders:  -     atorvastatin (LIPITOR) 20 MG tablet; Take 1 tablet by mouth Daily.  Dispense: 90 tablet; Refill: 1    4. Anemia, unspecified type  -     Ferritin  -     Vitamin B12  -     Iron Profile  -     Protein Electrophoresis, Random Urine - Urine, Clean Catch  -     Protein Electrophoresis, Total    5. Stage 3a chronic kidney disease (CKD)   Cr stable. Aware to avoid NSAIDS.               Reviewed cbc, cmp and flp.Non compliant with meds. Sent in today and discussed implications of non compliance including stroke, death, MI and dialysis. Start Lipitor.  Return in about 6 weeks (around 6/30/2023), or 30 minutes, for Lab Today, Lab Before FUP.

## 2023-05-23 LAB
ALBUMIN MFR UR ELPH: 37.4 %
ALBUMIN SERPL ELPH-MCNC: 3.7 G/DL (ref 2.9–4.4)
ALBUMIN/GLOB SERPL: 1.2 {RATIO} (ref 0.7–1.7)
ALPHA1 GLOB MFR UR ELPH: 5.9 %
ALPHA1 GLOB SERPL ELPH-MCNC: 0.1 G/DL (ref 0–0.4)
ALPHA2 GLOB MFR UR ELPH: 14.5 %
ALPHA2 GLOB SERPL ELPH-MCNC: 0.8 G/DL (ref 0.4–1)
B-GLOBULIN MFR UR ELPH: 24.2 %
B-GLOBULIN SERPL ELPH-MCNC: 1.2 G/DL (ref 0.7–1.3)
FERRITIN SERPL-MCNC: 73 NG/ML (ref 30–400)
GAMMA GLOB MFR UR ELPH: 18 %
GAMMA GLOB SERPL ELPH-MCNC: 1 G/DL (ref 0.4–1.8)
GLOBULIN SER CALC-MCNC: 3.1 G/DL (ref 2.2–3.9)
IRON SATN MFR SERPL: 27 % (ref 15–55)
IRON SERPL-MCNC: 82 UG/DL (ref 38–169)
LABORATORY COMMENT REPORT: NORMAL
LABORATORY COMMENT REPORT: NORMAL
M PROTEIN MFR UR ELPH: NORMAL %
M PROTEIN SERPL ELPH-MCNC: NORMAL G/DL
PROT SERPL-MCNC: 6.8 G/DL (ref 6–8.5)
PROT UR-MCNC: 26.4 MG/DL
TIBC SERPL-MCNC: 309 UG/DL (ref 250–450)
UIBC SERPL-MCNC: 227 UG/DL (ref 111–343)
VIT B12 SERPL-MCNC: 361 PG/ML (ref 232–1245)

## 2023-05-26 ENCOUNTER — APPOINTMENT (OUTPATIENT)
Dept: GENERAL RADIOLOGY | Facility: HOSPITAL | Age: 63
End: 2023-05-26
Payer: MEDICARE

## 2023-05-26 ENCOUNTER — HOSPITAL ENCOUNTER (OUTPATIENT)
Facility: HOSPITAL | Age: 63
Setting detail: OBSERVATION
Discharge: HOME | End: 2023-05-27
Attending: EMERGENCY MEDICINE | Admitting: EMERGENCY MEDICINE
Payer: MEDICARE

## 2023-05-26 ENCOUNTER — APPOINTMENT (OUTPATIENT)
Dept: CT IMAGING | Facility: HOSPITAL | Age: 63
End: 2023-05-26
Payer: MEDICARE

## 2023-05-26 DIAGNOSIS — R77.8 ELEVATED TROPONIN: ICD-10-CM

## 2023-05-26 DIAGNOSIS — I95.9 HYPOTENSION, UNSPECIFIED HYPOTENSION TYPE: ICD-10-CM

## 2023-05-26 DIAGNOSIS — N17.9 AKI (ACUTE KIDNEY INJURY): ICD-10-CM

## 2023-05-26 DIAGNOSIS — R55 NEAR SYNCOPE: Primary | ICD-10-CM

## 2023-05-26 DIAGNOSIS — I10 BENIGN ESSENTIAL HYPERTENSION: Chronic | ICD-10-CM

## 2023-05-26 LAB
BASOPHILS # BLD AUTO: 0.03 10*3/MM3 (ref 0–0.2)
BASOPHILS NFR BLD AUTO: 0.3 % (ref 0–1.5)
DEPRECATED RDW RBC AUTO: 38.4 FL (ref 37–54)
EOSINOPHIL # BLD AUTO: 0.16 10*3/MM3 (ref 0–0.4)
EOSINOPHIL NFR BLD AUTO: 1.8 % (ref 0.3–6.2)
ERYTHROCYTE [DISTWIDTH] IN BLOOD BY AUTOMATED COUNT: 12.7 % (ref 12.3–15.4)
HCT VFR BLD AUTO: 39.9 % (ref 37.5–51)
HGB BLD-MCNC: 13.7 G/DL (ref 13–17.7)
IMM GRANULOCYTES # BLD AUTO: 0.03 10*3/MM3 (ref 0–0.05)
IMM GRANULOCYTES NFR BLD AUTO: 0.3 % (ref 0–0.5)
LYMPHOCYTES # BLD AUTO: 1.43 10*3/MM3 (ref 0.7–3.1)
LYMPHOCYTES NFR BLD AUTO: 16.3 % (ref 19.6–45.3)
MCH RBC QN AUTO: 28.8 PG (ref 26.6–33)
MCHC RBC AUTO-ENTMCNC: 34.3 G/DL (ref 31.5–35.7)
MCV RBC AUTO: 84 FL (ref 79–97)
MONOCYTES # BLD AUTO: 0.71 10*3/MM3 (ref 0.1–0.9)
MONOCYTES NFR BLD AUTO: 8.1 % (ref 5–12)
NEUTROPHILS NFR BLD AUTO: 6.39 10*3/MM3 (ref 1.7–7)
NEUTROPHILS NFR BLD AUTO: 73.2 % (ref 42.7–76)
NRBC BLD AUTO-RTO: 0 /100 WBC (ref 0–0.2)
PLATELET # BLD AUTO: 249 10*3/MM3 (ref 140–450)
PMV BLD AUTO: 9.9 FL (ref 6–12)
RBC # BLD AUTO: 4.75 10*6/MM3 (ref 4.14–5.8)
WBC NRBC COR # BLD: 8.75 10*3/MM3 (ref 3.4–10.8)

## 2023-05-26 PROCEDURE — 84484 ASSAY OF TROPONIN QUANT: CPT | Performed by: EMERGENCY MEDICINE

## 2023-05-26 PROCEDURE — 70450 CT HEAD/BRAIN W/O DYE: CPT

## 2023-05-26 PROCEDURE — 85025 COMPLETE CBC W/AUTO DIFF WBC: CPT | Performed by: EMERGENCY MEDICINE

## 2023-05-26 PROCEDURE — 93010 ELECTROCARDIOGRAM REPORT: CPT | Performed by: INTERNAL MEDICINE

## 2023-05-26 PROCEDURE — 71045 X-RAY EXAM CHEST 1 VIEW: CPT

## 2023-05-26 PROCEDURE — 80053 COMPREHEN METABOLIC PANEL: CPT | Performed by: EMERGENCY MEDICINE

## 2023-05-26 PROCEDURE — 99284 EMERGENCY DEPT VISIT MOD MDM: CPT

## 2023-05-26 PROCEDURE — 93005 ELECTROCARDIOGRAM TRACING: CPT | Performed by: EMERGENCY MEDICINE

## 2023-05-26 RX ORDER — SODIUM CHLORIDE 0.9 % (FLUSH) 0.9 %
10 SYRINGE (ML) INJECTION AS NEEDED
Status: DISCONTINUED | OUTPATIENT
Start: 2023-05-26 | End: 2023-05-27 | Stop reason: HOSPADM

## 2023-05-26 RX ADMIN — SODIUM CHLORIDE 1000 ML: 9 INJECTION, SOLUTION INTRAVENOUS at 23:41

## 2023-05-27 ENCOUNTER — APPOINTMENT (OUTPATIENT)
Dept: CARDIOLOGY | Facility: HOSPITAL | Age: 63
End: 2023-05-27
Payer: MEDICARE

## 2023-05-27 ENCOUNTER — READMISSION MANAGEMENT (OUTPATIENT)
Dept: CALL CENTER | Facility: HOSPITAL | Age: 63
End: 2023-05-27
Payer: MEDICARE

## 2023-05-27 VITALS
TEMPERATURE: 97.8 F | OXYGEN SATURATION: 100 % | HEART RATE: 60 BPM | RESPIRATION RATE: 16 BRPM | BODY MASS INDEX: 21.92 KG/M2 | WEIGHT: 148 LBS | HEIGHT: 69 IN | DIASTOLIC BLOOD PRESSURE: 86 MMHG | SYSTOLIC BLOOD PRESSURE: 125 MMHG

## 2023-05-27 PROBLEM — I95.9 HYPOTENSION: Status: ACTIVE | Noted: 2023-05-27

## 2023-05-27 PROBLEM — R55 NEAR SYNCOPE: Status: ACTIVE | Noted: 2023-05-27

## 2023-05-27 LAB
ALBUMIN SERPL-MCNC: 4.3 G/DL (ref 3.5–5.2)
ALBUMIN/GLOB SERPL: 1.4 G/DL
ALP SERPL-CCNC: 61 U/L (ref 39–117)
ALT SERPL W P-5'-P-CCNC: 14 U/L (ref 1–41)
ANION GAP SERPL CALCULATED.3IONS-SCNC: 10.8 MMOL/L (ref 5–15)
ANION GAP SERPL CALCULATED.3IONS-SCNC: 12.8 MMOL/L (ref 5–15)
AORTIC DIMENSIONLESS INDEX: 0.7 (DI)
AST SERPL-CCNC: 15 U/L (ref 1–40)
BH CV ECHO MEAS - AO MAX PG: 4.5 MMHG
BH CV ECHO MEAS - AO MEAN PG: 2.45 MMHG
BH CV ECHO MEAS - AO ROOT DIAM: 3 CM
BH CV ECHO MEAS - AO V2 MAX: 106 CM/SEC
BH CV ECHO MEAS - AO V2 VTI: 23.5 CM
BH CV ECHO MEAS - AVA(I,D): 2.42 CM2
BH CV ECHO MEAS - EDV(CUBED): 171.2 ML
BH CV ECHO MEAS - EDV(MOD-SP2): 101 ML
BH CV ECHO MEAS - EDV(MOD-SP4): 108 ML
BH CV ECHO MEAS - EF(MOD-BP): 55.1 %
BH CV ECHO MEAS - EF(MOD-SP2): 58.4 %
BH CV ECHO MEAS - EF(MOD-SP4): 53.7 %
BH CV ECHO MEAS - ESV(CUBED): 95.9 ML
BH CV ECHO MEAS - ESV(MOD-SP2): 42 ML
BH CV ECHO MEAS - ESV(MOD-SP4): 50 ML
BH CV ECHO MEAS - FS: 17.6 %
BH CV ECHO MEAS - IVS/LVPW: 1.07 CM
BH CV ECHO MEAS - IVSD: 1 CM
BH CV ECHO MEAS - LAT PEAK E' VEL: 6.7 CM/SEC
BH CV ECHO MEAS - LV DIASTOLIC VOL/BSA (35-75): 59.4 CM2
BH CV ECHO MEAS - LV MASS(C)D: 207.9 GRAMS
BH CV ECHO MEAS - LV MAX PG: 2.41 MMHG
BH CV ECHO MEAS - LV MEAN PG: 1.12 MMHG
BH CV ECHO MEAS - LV SYSTOLIC VOL/BSA (12-30): 27.5 CM2
BH CV ECHO MEAS - LV V1 MAX: 77.6 CM/SEC
BH CV ECHO MEAS - LV V1 VTI: 16.2 CM
BH CV ECHO MEAS - LVIDD: 5.6 CM
BH CV ECHO MEAS - LVIDS: 4.6 CM
BH CV ECHO MEAS - LVOT AREA: 3.5 CM2
BH CV ECHO MEAS - LVOT DIAM: 2.12 CM
BH CV ECHO MEAS - LVPWD: 0.94 CM
BH CV ECHO MEAS - MED PEAK E' VEL: 5.2 CM/SEC
BH CV ECHO MEAS - MR MAX PG: 81.3 MMHG
BH CV ECHO MEAS - MR MAX VEL: 451 CM/SEC
BH CV ECHO MEAS - MV A DUR: 0.1 SEC
BH CV ECHO MEAS - MV A MAX VEL: 71.1 CM/SEC
BH CV ECHO MEAS - MV DEC SLOPE: 320 CM/SEC2
BH CV ECHO MEAS - MV DEC TIME: 190 MSEC
BH CV ECHO MEAS - MV E MAX VEL: 60.8 CM/SEC
BH CV ECHO MEAS - MV E/A: 0.85
BH CV ECHO MEAS - MV MAX PG: 2.45 MMHG
BH CV ECHO MEAS - MV MEAN PG: 0.91 MMHG
BH CV ECHO MEAS - MV P1/2T: 59.6 MSEC
BH CV ECHO MEAS - MV V2 VTI: 25.8 CM
BH CV ECHO MEAS - MVA(P1/2T): 3.7 CM2
BH CV ECHO MEAS - MVA(VTI): 2.2 CM2
BH CV ECHO MEAS - PA ACC TIME: 0.11 SEC
BH CV ECHO MEAS - PA PR(ACCEL): 27.9 MMHG
BH CV ECHO MEAS - PA V2 MAX: 68.6 CM/SEC
BH CV ECHO MEAS - PULM A REVS DUR: 0.1 SEC
BH CV ECHO MEAS - PULM A REVS VEL: 31.3 CM/SEC
BH CV ECHO MEAS - PULM DIAS VEL: 33.4 CM/SEC
BH CV ECHO MEAS - PULM S/D: 1.33
BH CV ECHO MEAS - PULM SYS VEL: 44.6 CM/SEC
BH CV ECHO MEAS - RAP SYSTOLE: 3 MMHG
BH CV ECHO MEAS - RV MAX PG: 0.78 MMHG
BH CV ECHO MEAS - RV V1 MAX: 44.1 CM/SEC
BH CV ECHO MEAS - RV V1 VTI: 11.3 CM
BH CV ECHO MEAS - RVSP: 19.8 MMHG
BH CV ECHO MEAS - SI(MOD-SP2): 32.5 ML/M2
BH CV ECHO MEAS - SI(MOD-SP4): 31.9 ML/M2
BH CV ECHO MEAS - SV(LVOT): 56.9 ML
BH CV ECHO MEAS - SV(MOD-SP2): 59 ML
BH CV ECHO MEAS - SV(MOD-SP4): 58 ML
BH CV ECHO MEAS - TAPSE (>1.6): 2.2 CM
BH CV ECHO MEAS - TR MAX PG: 16.8 MMHG
BH CV ECHO MEAS - TR MAX VEL: 205 CM/SEC
BH CV ECHO MEASUREMENTS AVERAGE E/E' RATIO: 10.22
BH CV XLRA - RV BASE: 3.4 CM
BH CV XLRA - TDI S': 8.7 CM/SEC
BILIRUB SERPL-MCNC: 0.8 MG/DL (ref 0–1.2)
BUN SERPL-MCNC: 25 MG/DL (ref 8–23)
BUN SERPL-MCNC: 26 MG/DL (ref 8–23)
BUN/CREAT SERPL: 14.6 (ref 7–25)
BUN/CREAT SERPL: 17.8 (ref 7–25)
CALCIUM SPEC-SCNC: 8.7 MG/DL (ref 8.6–10.5)
CALCIUM SPEC-SCNC: 9 MG/DL (ref 8.6–10.5)
CHLORIDE SERPL-SCNC: 103 MMOL/L (ref 98–107)
CHLORIDE SERPL-SCNC: 98 MMOL/L (ref 98–107)
CO2 SERPL-SCNC: 24.2 MMOL/L (ref 22–29)
CO2 SERPL-SCNC: 24.2 MMOL/L (ref 22–29)
CREAT SERPL-MCNC: 1.46 MG/DL (ref 0.76–1.27)
CREAT SERPL-MCNC: 1.71 MG/DL (ref 0.76–1.27)
EGFRCR SERPLBLD CKD-EPI 2021: 44.4 ML/MIN/1.73
EGFRCR SERPLBLD CKD-EPI 2021: 53.7 ML/MIN/1.73
GEN 5 2HR TROPONIN T REFLEX: 14 NG/L
GLOBULIN UR ELPH-MCNC: 3 GM/DL
GLUCOSE SERPL-MCNC: 86 MG/DL (ref 65–99)
GLUCOSE SERPL-MCNC: 89 MG/DL (ref 65–99)
LEFT ATRIUM VOLUME INDEX: 16.6 ML/M2
MAXIMAL PREDICTED HEART RATE: 157 BPM
POTASSIUM SERPL-SCNC: 4.6 MMOL/L (ref 3.5–5.2)
POTASSIUM SERPL-SCNC: 4.6 MMOL/L (ref 3.5–5.2)
PROT SERPL-MCNC: 7.3 G/DL (ref 6–8.5)
QT INTERVAL: 415 MS
SODIUM SERPL-SCNC: 133 MMOL/L (ref 136–145)
SODIUM SERPL-SCNC: 140 MMOL/L (ref 136–145)
STRESS TARGET HR: 133 BPM
TROPONIN T DELTA: 0 NG/L
TROPONIN T SERPL HS-MCNC: 14 NG/L

## 2023-05-27 PROCEDURE — 84484 ASSAY OF TROPONIN QUANT: CPT | Performed by: EMERGENCY MEDICINE

## 2023-05-27 PROCEDURE — 99213 OFFICE O/P EST LOW 20 MIN: CPT | Performed by: INTERNAL MEDICINE

## 2023-05-27 PROCEDURE — G0378 HOSPITAL OBSERVATION PER HR: HCPCS

## 2023-05-27 PROCEDURE — 93306 TTE W/DOPPLER COMPLETE: CPT

## 2023-05-27 PROCEDURE — 93306 TTE W/DOPPLER COMPLETE: CPT | Performed by: INTERNAL MEDICINE

## 2023-05-27 PROCEDURE — 80048 BASIC METABOLIC PNL TOTAL CA: CPT | Performed by: STUDENT IN AN ORGANIZED HEALTH CARE EDUCATION/TRAINING PROGRAM

## 2023-05-27 PROCEDURE — 36415 COLL VENOUS BLD VENIPUNCTURE: CPT

## 2023-05-27 RX ORDER — ACETAMINOPHEN 325 MG/1
650 TABLET ORAL EVERY 4 HOURS PRN
Status: DISCONTINUED | OUTPATIENT
Start: 2023-05-27 | End: 2023-05-27 | Stop reason: HOSPADM

## 2023-05-27 RX ORDER — LOSARTAN POTASSIUM 50 MG/1
50 TABLET ORAL DAILY
Qty: 30 TABLET | Refills: 0 | Status: SHIPPED | OUTPATIENT
Start: 2023-05-27 | End: 2023-06-05

## 2023-05-27 RX ORDER — ONDANSETRON 2 MG/ML
4 INJECTION INTRAMUSCULAR; INTRAVENOUS EVERY 6 HOURS PRN
Status: DISCONTINUED | OUTPATIENT
Start: 2023-05-27 | End: 2023-05-27 | Stop reason: HOSPADM

## 2023-05-27 RX ORDER — ONDANSETRON 4 MG/1
4 TABLET, FILM COATED ORAL EVERY 6 HOURS PRN
Status: DISCONTINUED | OUTPATIENT
Start: 2023-05-27 | End: 2023-05-27 | Stop reason: HOSPADM

## 2023-05-27 RX ORDER — SODIUM CHLORIDE 9 MG/ML
40 INJECTION, SOLUTION INTRAVENOUS AS NEEDED
Status: DISCONTINUED | OUTPATIENT
Start: 2023-05-27 | End: 2023-05-27 | Stop reason: HOSPADM

## 2023-05-27 RX ORDER — SODIUM CHLORIDE 0.9 % (FLUSH) 0.9 %
10 SYRINGE (ML) INJECTION AS NEEDED
Status: DISCONTINUED | OUTPATIENT
Start: 2023-05-27 | End: 2023-05-27 | Stop reason: HOSPADM

## 2023-05-27 RX ORDER — BISACODYL 5 MG/1
5 TABLET, DELAYED RELEASE ORAL DAILY PRN
Status: DISCONTINUED | OUTPATIENT
Start: 2023-05-27 | End: 2023-05-27 | Stop reason: HOSPADM

## 2023-05-27 RX ORDER — AMOXICILLIN 250 MG
2 CAPSULE ORAL 2 TIMES DAILY
Status: DISCONTINUED | OUTPATIENT
Start: 2023-05-27 | End: 2023-05-27 | Stop reason: HOSPADM

## 2023-05-27 RX ORDER — BISACODYL 10 MG
10 SUPPOSITORY, RECTAL RECTAL DAILY PRN
Status: DISCONTINUED | OUTPATIENT
Start: 2023-05-27 | End: 2023-05-27 | Stop reason: HOSPADM

## 2023-05-27 RX ORDER — CHOLECALCIFEROL (VITAMIN D3) 125 MCG
5 CAPSULE ORAL NIGHTLY PRN
Status: DISCONTINUED | OUTPATIENT
Start: 2023-05-27 | End: 2023-05-27 | Stop reason: HOSPADM

## 2023-05-27 RX ORDER — NITROGLYCERIN 0.4 MG/1
0.4 TABLET SUBLINGUAL
Status: DISCONTINUED | OUTPATIENT
Start: 2023-05-27 | End: 2023-05-27 | Stop reason: HOSPADM

## 2023-05-27 RX ORDER — SODIUM CHLORIDE 0.9 % (FLUSH) 0.9 %
10 SYRINGE (ML) INJECTION EVERY 12 HOURS SCHEDULED
Status: DISCONTINUED | OUTPATIENT
Start: 2023-05-27 | End: 2023-05-27 | Stop reason: HOSPADM

## 2023-05-27 RX ORDER — SODIUM CHLORIDE 9 MG/ML
75 INJECTION, SOLUTION INTRAVENOUS CONTINUOUS
Status: DISCONTINUED | OUTPATIENT
Start: 2023-05-27 | End: 2023-05-27

## 2023-05-27 RX ORDER — POLYETHYLENE GLYCOL 3350 17 G/17G
17 POWDER, FOR SOLUTION ORAL DAILY PRN
Status: DISCONTINUED | OUTPATIENT
Start: 2023-05-27 | End: 2023-05-27 | Stop reason: HOSPADM

## 2023-05-27 RX ORDER — TAMSULOSIN HYDROCHLORIDE 0.4 MG/1
0.4 CAPSULE ORAL EVERY MORNING
Status: DISCONTINUED | OUTPATIENT
Start: 2023-05-27 | End: 2023-05-27 | Stop reason: HOSPADM

## 2023-05-27 RX ADMIN — Medication 10 ML: at 09:01

## 2023-05-27 RX ADMIN — TAMSULOSIN HYDROCHLORIDE 0.4 MG: 0.4 CAPSULE ORAL at 09:01

## 2023-05-27 RX ADMIN — Medication 10 ML: at 04:11

## 2023-05-27 NOTE — NURSING NOTE
.  Nursing report ED to floor  Wm MESA Record  62 y.o.  male    HPI :   Chief Complaint   Patient presents with   • Hypotension       Admitting doctor:   Montez Carlin MD    Admitting diagnosis:   The primary encounter diagnosis was Near syncope. Diagnoses of Hypotension, unspecified hypotension type, Elevated troponin, and KATALIAN (acute kidney injury) were also pertinent to this visit.    Code status:   Current Code Status     Date Active Code Status Order ID Comments User Context       5/27/2023 0228 CPR (Attempt to Resuscitate) [id number]  Nakia Hill PA-C ED      Question Answer    Code Status (Patient has no pulse and is not breathing) CPR (Attempt to Resuscitate)    Medical Interventions (Patient has pulse or is breathing) Full Support                Allergies:   Shingrix [zoster vac recomb adjuvanted]    Isolation:   No active isolations    Intake and Output    Intake/Output Summary (Last 24 hours) at 5/27/2023 0252  Last data filed at 5/27/2023 0131  Gross per 24 hour   Intake 1000 ml   Output --   Net 1000 ml       Weight:       05/26/23 2327   Weight: 67.1 kg (148 lb)       Most recent vitals:   Vitals:    05/27/23 0025 05/27/23 0036 05/27/23 0106 05/27/23 0206   BP: 117/73 115/73 120/71 120/85   BP Location:    Right arm   Patient Position:    Lying   Pulse: 57 59 59 68   Resp:    17   Temp:       TempSrc:       SpO2:  100% 100% 99%   Weight:       Height:           Active LDAs/IV Access:   Lines, Drains & Airways     Active LDAs     Name Placement date Placement time Site Days    Peripheral IV 05/26/23 2341 Left Antecubital 05/26/23  2341  Antecubital  less than 1                Labs (abnormal labs have a star):   Labs Reviewed   COMPREHENSIVE METABOLIC PANEL - Abnormal; Notable for the following components:       Result Value    BUN 25 (*)     Creatinine 1.71 (*)     Sodium 133 (*)     eGFR 44.4 (*)     All other components within normal limits    Narrative:     GFR Normal >60  Chronic  Kidney Disease <60  Kidney Failure <15     CBC WITH AUTO DIFFERENTIAL - Abnormal; Notable for the following components:    Lymphocyte % 16.3 (*)     All other components within normal limits   TROPONIN - Normal    Narrative:     High Sensitive Troponin T Reference Range:  <10.0 ng/L- Negative Female for AMI  <15.0 ng/L- Negative Male for AMI  >=10 - Abnormal Female indicating possible myocardial injury.  >=15 - Abnormal Male indicating possible myocardial injury.   Clinicians would have to utilize clinical acumen, EKG, Troponin, and serial changes to determine if it is an Acute Myocardial Infarction or myocardial injury due to an underlying chronic condition.        HIGH SENSITIVITIY TROPONIN T 2HR - Normal    Narrative:     High Sensitive Troponin T Reference Range:  <10.0 ng/L- Negative Female for AMI  <15.0 ng/L- Negative Male for AMI  >=10 - Abnormal Female indicating possible myocardial injury.  >=15 - Abnormal Male indicating possible myocardial injury.   Clinicians would have to utilize clinical acumen, EKG, Troponin, and serial changes to determine if it is an Acute Myocardial Infarction or myocardial injury due to an underlying chronic condition.        CBC AND DIFFERENTIAL    Narrative:     The following orders were created for panel order CBC & Differential.  Procedure                               Abnormality         Status                     ---------                               -----------         ------                     CBC Auto Differential[[id number]]        Abnormal            Final result                 Please view results for these tests on the individual orders.       EKG:   ECG 12 Lead Syncope   Preliminary Result   HEART RATE= 59  bpm   RR Interval= 1017  ms   ND Interval= 215  ms   P Horizontal Axis= -13  deg   P Front Axis= 35  deg   QRSD Interval= 97  ms   QT Interval= 415  ms   QRS Axis= 13  deg   T Wave Axis= 23  deg   - BORDERLINE ECG -   Sinus rhythm   Borderline prolonged ND  interval   Electronically Signed By:    Date and Time of Study: 2023-05-26 23:48:15          Meds given in ED:   Medications   sodium chloride 0.9 % flush 10 mL (has no administration in time range)   sodium chloride 0.9 % flush 10 mL (has no administration in time range)   sodium chloride 0.9 % flush 10 mL (has no administration in time range)   sodium chloride 0.9 % infusion 40 mL (has no administration in time range)   sennosides-docusate (PERICOLACE) 8.6-50 MG per tablet 2 tablet (has no administration in time range)     And   polyethylene glycol (MIRALAX) packet 17 g (has no administration in time range)     And   bisacodyl (DULCOLAX) EC tablet 5 mg (has no administration in time range)     And   bisacodyl (DULCOLAX) suppository 10 mg (has no administration in time range)   ondansetron (ZOFRAN) tablet 4 mg (has no administration in time range)     Or   ondansetron (ZOFRAN) injection 4 mg (has no administration in time range)   nitroglycerin (NITROSTAT) SL tablet 0.4 mg (has no administration in time range)   acetaminophen (TYLENOL) tablet 650 mg (has no administration in time range)   melatonin tablet 5 mg (has no administration in time range)   sodium chloride 0.9 % infusion (has no administration in time range)   sodium chloride 0.9 % bolus 1,000 mL (0 mL Intravenous Stopped 5/27/23 0131)       Imaging results:  CT Head Without Contrast    Result Date: 5/27/2023  No acute intracranial findings. Sinus inflammatory changes.  Radiation dose reduction techniques were utilized, including automated exposure control and exposure modulation based on body size.  This report was finalized on 5/27/2023 12:43 AM by Dr. Sharron Anand M.D.      XR Chest 1 View    Result Date: 5/27/2023  No acute findings.  This report was finalized on 5/27/2023 12:07 AM by Dr. Sharron Anand M.D.        Ambulatory status:   as tolerated     Social issues:   Social History     Socioeconomic History   • Marital status: Single    Tobacco Use   • Smoking status: Never   • Smokeless tobacco: Never   • Tobacco comments:     caffeine use- denies   Substance and Sexual Activity   • Alcohol use: Never   • Drug use: Never   • Sexual activity: Yes     Birth control/protection: Condom       NIH Stroke Scale:         Loree Thompson RN  05/27/23 02:52 EDT

## 2023-05-27 NOTE — PROGRESS NOTES
ED OBSERVATION PROGRESS/DISCHARGE SUMMARY    Date of Admission: 5/26/2023   LOS: 0 days   PCP: Dale Andrade MD    Final Diagnosis near syncope      Subjective     Hospital Outcome:   Pleasant afebrile ambulatory 62-year-old  male admitted to the ED observation unit for near syncopal episode yesterday.  Patient has a known history of chronic kidney disease with creatinine baseline 1.4-1.5.  His creatinine in the emergency department yesterday was noted to be 1.71 and he was given a liter of fluids.  His creatinine this morning is 1.46.    CT head in the emergency department yesterday showed no acute intracranial findings.  His echocardiogram this afternoon showed ejection fraction of 55.1% with normal left ventricular diastolic function.  He was seen and evaluated by Dr. Girard with the cardiology service who feels that down titrating patient's blood pressure medication would likely be helpful as he has had some low blood pressures.  I discussed this with patient we will plan to cut his losartan dose in half and have him check his blood pressures twice daily and bring a log of this to his primary care provider office.    ROS:  General: no fevers, chills  Respiratory: no cough, dyspnea  Cardiovascular: no chest pain, palpitations  Abdomen: No abdominal pain, nausea, vomiting, or diarrhea  Neurologic: No focal weakness    Objective   Physical Exam:  I have reviewed the vital signs.  Temp:  [97.4 °F (36.3 °C)-98.3 °F (36.8 °C)] 97.8 °F (36.6 °C)  Heart Rate:  [56-68] 60  Resp:  [15-18] 16  BP: ()/(62-87) 125/86  General Appearance:    Alert, cooperative, no distress  Head:    Normocephalic, atraumatic  Eyes:    Sclerae anicteric  Neck:   Supple, no mass  Lungs: Clear to auscultation bilaterally, respirations unlabored  Heart: Regular rate and rhythm, S1 and S2 normal, no murmur, rub or gallop  Abdomen:  Soft, non-tender, bowel sounds active, nondistended  Extremities: No clubbing, cyanosis, or edema  to lower extremities  Pulses:  2+ and symmetric in distal lower extremities  Skin: No rashes   Neurologic: Oriented x3, Normal strength to extremities    Results Review:    I have reviewed the labs, radiology results and diagnostic studies.    Results from last 7 days   Lab Units 05/26/23  2341   WBC 10*3/mm3 8.75   HEMOGLOBIN g/dL 13.7   HEMATOCRIT % 39.9   PLATELETS 10*3/mm3 249     Results from last 7 days   Lab Units 05/27/23  0911 05/26/23  2341   SODIUM mmol/L 140 133*   POTASSIUM mmol/L 4.6 4.6   CHLORIDE mmol/L 103 98   CO2 mmol/L 24.2 24.2   BUN mg/dL 26* 25*   CREATININE mg/dL 1.46* 1.71*   CALCIUM mg/dL 8.7 9.0   BILIRUBIN mg/dL  --  0.8   ALK PHOS U/L  --  61   ALT (SGPT) U/L  --  14   AST (SGOT) U/L  --  15   GLUCOSE mg/dL 86 89     Imaging Results (Last 24 Hours)     Procedure Component Value Units Date/Time    CT Head Without Contrast [[id number]] Collected: 05/27/23 0040     Updated: 05/27/23 0111    Narrative:      CT OF THE HEAD WITHOUT CONTRAST     HISTORY: Mental status changes and hypotension     COMPARISON: None available.     TECHNIQUE: Axial CT imaging was obtained through the brain. No IV  contrast was administered.     FINDINGS:  No acute intracranial hemorrhage is seen. Ventricles are normal in size.  There is no midline shift or mass effect. There is persistent  opacification of the ethmoid sinuses, as well as an air-fluid level  within the left maxillary sinus. Correlation with any evidence of  sinusitis is recommended.       Impression:      No acute intracranial findings. Sinus inflammatory changes.     Radiation dose reduction techniques were utilized, including automated  exposure control and exposure modulation based on body size.     This report was finalized on 5/27/2023 12:43 AM by Dr. Sharron Anand M.D.       XR Chest 1 View [[id number]] Collected: 05/27/23 0007     Updated: 05/27/23 0011    Narrative:      SINGLE VIEW OF THE CHEST     HISTORY: Hypotension     COMPARISON:  01/28/2022     FINDINGS:  Heart size is within normal limits. No pneumothorax, pleural effusion,  or acute infiltrate is seen.       Impression:      No acute findings.     This report was finalized on 5/27/2023 12:07 AM by Dr. Sharron Anand M.D.           Echocardiogram 5/27/2023  Interpretation Summary       •  Left ventricular systolic function is normal. Calculated left ventricular EF = 55.1%  •  Left ventricular diastolic function was normal.  •  Estimated right ventricular systolic pressure from tricuspid regurgitation is normal (<35 mmHg).         I have reviewed the medications.  ---------------------------------------------------------------------------------------------  Assessment & Plan   Assessment/Problem List    Near syncope    Hypotension      Plan:    Near syncope  Hypotension  Cardiomyopathy  -CT head negative acute  -Chest x-ray negative acute  -high-sensitivity troponin negative x2  -EKG shows sinus rhythm, no acute ischemia  -Echocardiogram ordered  -Orthostatics blood pressures are negative  -Decrease losartan to 50 mg daily  -Cardiology consulted     Acute on chronic kidney disease  Hyponatremia resolved  -Creatinine this a.m. 1.46     Pulmonary sarcoidosis  -No acute issues     Hyperlipidemia  -Continue statin     BPH  -Suprapubic catheter in place  -Continue Flomax    Disposition: Home    Follow-up after Discharge: PCP    This note will serve as a discharge summary.    Elizabeth Pandya, APRN 05/27/23 12:02 EDT    I have worn appropriate PPE during this patient encounter, sanitized my hands both with entering and exiting patient's room.      35 minutes has been spent by [facility] providers in the care of this patient while under observation status

## 2023-05-27 NOTE — CONSULTS
Patient Name: Wm MESA Record  :1960  62 y.o.    Date of Admission: 2023  Date of Consultation:  23  Encounter Provider: Bradley Girard III, MD  Place of Service: [facility] CARDIOLOGY  Referring Provider: No ref. provider found  Patient Care Team:  Dale Andrade MD as PCP - General (Internal Medicine)      Chief complaint: near syncope, hypotension    History of Present Illness:    62-year-old male who presented to the emergency room with complaint of dizziness, near syncope, and hypotension.  We were asked to see him to evaluate if there was a cardiac contribution to his symptoms.    Patient has a past medical history significant for pulmonary sarcoidosis, urinary retention with a suprapubic catheter, and also hypertension.  He has a history of nonischemic cardiomyopathy with recovery function that was felt to be secondary to uncontrolled hypertension in the past.  Most recent echocardiogram 2022 showed an ejection of 55-60%.    Patient states that he was in his kitchen yesterday evening cooking when he started feeling lightheaded.  He says he developed tunnel vision.  He just did not feel right.  He did not have any chest pain or chest discomfort.  He did not have any sensation of racing.  No shortness of breath.  He took his blood pressure cuff and got the  at his apartment building to check his blood pressure and it was low.  EMS was called.  Blood pressure the emergency room was 90/60.  Patient says he is felt okay overnight no further symptoms.    Past Medical History:   Diagnosis Date   • Leukocytoclastic vasculitis    • Tremor     seen at        Past Surgical History:   Procedure Laterality Date   • COLONOSCOPY  10/13/2020   • INTERSTIM PLACEMENT     • LUNG SURGERY     • PROSTATE SURGERY     • SUPRAPUBIC CATHETER INSERTION           Prior to Admission medications    Medication Sig Start Date End Date Taking? Authorizing  Provider   econazole nitrate (SPECTAZOLE) 1 % cream Apply 1 application topically to the appropriate area as directed 2 (Two) Times a Day. 3/29/22  Yes Dale Andrade MD   losartan (COZAAR) 100 MG tablet Take 1 tablet by mouth Daily.  Patient taking differently: Take 1 tablet by mouth Every Morning. 5/19/23  Yes Dale Andrade MD   metoprolol succinate XL (TOPROL-XL) 50 MG 24 hr tablet Take 1 tablet by mouth Daily.  Patient taking differently: Take 1 tablet by mouth Every Morning. 5/19/23  Yes Dale Andrade MD   spironolactone (ALDACTONE) 25 MG tablet Take 1 tablet by mouth Daily.  Patient taking differently: Take 1 tablet by mouth Every Morning. 5/19/23  Yes Dale Andrade MD   tamsulosin (FLOMAX) 0.4 MG capsule 24 hr capsule 1 capsule Every Morning.   Yes ProviderHolly MD   Acetaminophen (TYLENOL EXTRA STRENGTH PO) Take  by mouth.    ProviderHolly MD   Tolnaftate 1 % gel Apply  topically.    ProviderHolly MD   atorvastatin (LIPITOR) 20 MG tablet Take 1 tablet by mouth Daily. 5/19/23 5/27/23  Dale Andrade MD       Allergies   Allergen Reactions   • Shingrix [Zoster Vac Recomb Adjuvanted] Rash       Social History     Socioeconomic History   • Marital status: Single   Tobacco Use   • Smoking status: Never   • Smokeless tobacco: Never   • Tobacco comments:     caffeine use- denies   Vaping Use   • Vaping Use: Never used   Substance and Sexual Activity   • Alcohol use: Never   • Drug use: Never   • Sexual activity: Yes     Birth control/protection: Condom       Family History   Problem Relation Age of Onset   • Cancer Mother    • Other Father         gunshot       REVIEW OF SYSTEMS:   All systems reviewed.  Pertinent positives identified in HPI.  All other systems are negative.      Objective:     Vitals:    05/27/23 0400 05/27/23 0403 05/27/23 0407 05/27/23 0751   BP: 103/69 102/77 104/76 122/87   BP Location: Right arm Right arm Right arm Right arm   Patient  Position: Lying Standing Standing Sitting   Pulse: 60 64 62 60   Resp: 16   16   Temp: 97.4 °F (36.3 °C)   97.6 °F (36.4 °C)   TempSrc: Oral   Oral   SpO2: 100%   100%   Weight:       Height:         Body mass index is 21.86 kg/m².    Physical Exam:  General Appearance:    Alert, cooperative, in no acute distress   Head:    Normocephalic, without obvious abnormality   Eyes:            Lids and lashes normal, conjunctivae and sclerae normal, no icterus, no pallor, corneas clear   Ears:    Ears appear intact with no abnormalities noted   Throat:   No oral lesions, oral mucosa moist   Neck:   No adenopathy, supple, trachea midline, no thyromegaly, no carotid bruit, no JVD   Back:     No kyphosis present, no erythema or scars, no tenderness to palpation    Lungs:     Clear to auscultation,respirations regular, even and unlabored    Heart:    Regular rhythm and normal rate, normal S1 and S2, no murmur, no gallop, no rub, no click   Chest Wall:    No abnormalities observed   Abdomen:     Normal bowel sounds, no masses, no organomegaly, soft        non-tender, non-distended, no guarding   Extremities:   Moves all extremities well, no edema, no cyanosis, no redness   Pulses:  Bilateral carotids brisk   Skin:  Psychiatric:   No bleeding or rash    Alert and oriented, normal mood and affect         Lab Review:     Results from last 7 days   Lab Units 05/26/23  2341   SODIUM mmol/L 133*   POTASSIUM mmol/L 4.6   CHLORIDE mmol/L 98   CO2 mmol/L 24.2   BUN mg/dL 25*   CREATININE mg/dL 1.71*   CALCIUM mg/dL 9.0   BILIRUBIN mg/dL 0.8   ALK PHOS U/L 61   ALT (SGPT) U/L 14   AST (SGOT) U/L 15   GLUCOSE mg/dL 89     Results from last 7 days   Lab Units 05/27/23  0152 05/26/23  2341   HSTROP T ng/L 14 14     Results from last 7 days   Lab Units 05/26/23  2341   WBC 10*3/mm3 8.75   HEMOGLOBIN g/dL 13.7   HEMATOCRIT % 39.9   PLATELETS 10*3/mm3 249                           I personally viewed and interpreted the patient's EKG/Telemetry  data.      Current Facility-Administered Medications:   •  acetaminophen (TYLENOL) tablet 650 mg, 650 mg, Oral, Q4H PRN, Nakia Hill PA-C  •  sennosides-docusate (PERICOLACE) 8.6-50 MG per tablet 2 tablet, 2 tablet, Oral, BID **AND** polyethylene glycol (MIRALAX) packet 17 g, 17 g, Oral, Daily PRN **AND** bisacodyl (DULCOLAX) EC tablet 5 mg, 5 mg, Oral, Daily PRN **AND** bisacodyl (DULCOLAX) suppository 10 mg, 10 mg, Rectal, Daily PRN, Nakia Hill PA-C  •  melatonin tablet 5 mg, 5 mg, Oral, Nightly PRN, Nakia Hill PA-C  •  nitroglycerin (NITROSTAT) SL tablet 0.4 mg, 0.4 mg, Sublingual, Q5 Min PRN, Nakia Hill PA-C  •  ondansetron (ZOFRAN) tablet 4 mg, 4 mg, Oral, Q6H PRN **OR** ondansetron (ZOFRAN) injection 4 mg, 4 mg, Intravenous, Q6H PRN, Nakia Hill PA-C  •  [COMPLETED] Insert Peripheral IV, , , Once **AND** sodium chloride 0.9 % flush 10 mL, 10 mL, Intravenous, PRN, Juan Manuel Mckeon MD  •  sodium chloride 0.9 % flush 10 mL, 10 mL, Intravenous, Q12H, Nakia Hill PA-C, 10 mL at 05/27/23 0411  •  sodium chloride 0.9 % flush 10 mL, 10 mL, Intravenous, PRN, Nakia Hill PA-C  •  sodium chloride 0.9 % infusion 40 mL, 40 mL, Intravenous, PRN, Nakia Hill PA-C  •  tamsulosin (FLOMAX) 24 hr capsule 0.4 mg, 0.4 mg, Oral, QAM, Nakia Hill PA-C    Assessment and Plan:       Active Hospital Problems    Diagnosis  POA   • **Near syncope [R55]  Yes   • Hypotension [I95.9]  Yes      Resolved Hospital Problems   No resolved problems to display.     1.  Hypotension with near syncope- symptoms appear to be attributed to low blood pressure.  We have been asked to get an echocardiogram to evaluate for any cardiac contribution to this.  At this point there is no evidence of any cardiac issues.  Creatinine is elevated, I am suspicious that he is somewhat intravascularly depleted although he says that he thinks he has been drinking okay.   Patient is on combination medical therapy for hypertension which looks like it should likely be down titrated.  If no abnormality seen on the echocardiogram I have no other planned evaluation and we will sign off at that time.  Blood pressure is managed by his primary Dr. Andrade.    Bradley Girard III, MD  05/27/23  08:12 EDT

## 2023-05-27 NOTE — OUTREACH NOTE
Prep Survey    Flowsheet Row Responses   [facility] patient discharged from? [place]   Is LACE score < 7 ? Yes   Eligibility [facility]   Date of Admission 05/26/23   Date of Discharge 05/27/23   Discharge Disposition Home or Self Care   Discharge diagnosis Near syncope   Does the patient have one of the following disease processes/diagnoses(primary or secondary)? Other   Does the patient have Home health ordered? No   Is there a DME ordered? No   Prep survey completed? Yes          Renay COYNE - Registered Nurse

## 2023-05-27 NOTE — H&P
[facility]   HISTORY AND PHYSICAL    Patient Name: Wm Mandel  : 1960  MRN: [id number]  Primary Care Physician:  Dale Andrade MD  Date of admission: 2023    Subjective   Subjective     Chief Complaint:   Chief Complaint   Patient presents with   • Hypotension         HPI:    Wm Mandel is a 62 y.o. male with a history of cardiomyopathy, hypertension, pulmonary sarcoidosis, CKD, BPH, and  hyperlipidemia who presents to [facility] ER after a near syncopal episode at home.  Patient states he was at home cooking dinner for herself when he became really lightheaded and felt like he was going to pass out.  He states his vision became blurred and he urinated on himself without realizing.  He states he never passed out or lost consciousness but got seated quickly and started to feel better.  He states after a bit he got up to go and changes close still feeling a bit lightheaded and sat down and rest for a bit longer.  He states after about 45 minutes to an hour he was feeling back to normal.  He states he walked over to a neighbors who helped him check his blood pressure and reported it was very low and then called EMS.  He denies any chest pain chest discomfort.  Denies shortness of breath or palpitations.  Denies numbness and tingling or focal weakness.  Denies difficulty with his speech or slurring.  Denies abdominal pain nausea or diarrhea.  Denies recent fevers and chills.    In the ER, CT head without showed no acute intracranial findings.  Chest x-ray shows no acute findings.  2 high-sensitivity troponins were negative in the ER and EKG showed sinus rhythm with no acute ischemia.  Amatory work-up is notable for elevated creatinine of 1.71 and a low sodium of 133.  Was given a liter of IV fluids in the ER.    Review of Systems   All systems were reviewed and negative except for: what is mentioned above in the HPI.     Personal History     Past Medical History:   Diagnosis  Date   • Leukocytoclastic vasculitis    • Tremor     seen at        Past Surgical History:   Procedure Laterality Date   • COLONOSCOPY  10/13/2020   • INTERSTIM PLACEMENT  2023   • LUNG SURGERY     • PROSTATE SURGERY     • SUPRAPUBIC CATHETER INSERTION         Family History: family history includes Cancer in his mother; Other in his father. Otherwise pertinent FHx was reviewed and not pertinent to current issue.    Social History:  reports that he has never smoked. He has never used smokeless tobacco. He reports that he does not drink alcohol and does not use drugs.    Home Medications:  Acetaminophen, Tolnaftate, atorvastatin, econazole nitrate, losartan, metoprolol succinate XL, spironolactone, and tamsulosin    Allergies:  Allergies   Allergen Reactions   • Shingrix [Zoster Vac Recomb Adjuvanted] Rash       Objective   Objective     Vitals:   Temp:  [98.3 °F (36.8 °C)] 98.3 °F (36.8 °C)  Heart Rate:  [56-68] 68  Resp:  [15-18] 17  BP: ()/(62-85) 120/85  Physical Exam    Constitutional: 62-year-old male no acute distress on room air   Eyes: PERRLA, sclerae anicteric, no conjunctival injection   HENT: NCAT, mucous membranes moist   Neck: Supple, no thyromegaly, no lymphadenopathy, trachea midline   Respiratory: Clear to auscultation bilaterally, nonlabored respirations    Cardiovascular: RRR, no murmurs, rubs, or gallops, palpable pedal pulses bilaterally   Gastrointestinal: Positive bowel sounds, soft, nontender, nondistended   Musculoskeletal: No bilateral ankle edema, no clubbing or cyanosis to extremities   Psychiatric: Appropriate affect, cooperative   Neurologic: Oriented x 3, strength symmetric in all extremities, Cranial Nerves grossly intact to confrontation, speech clear   Skin: No rashes     Result Review    Result Review:  I have personally reviewed the results from the time of this admission to 5/27/2023 02:28 EDT and agree with these findings:  [x]  Laboratory list / accordion  []   Microbiology  [x]  Radiology  [x]  EKG/Telemetry   []  Cardiology/Vascular   []  Pathology  [x]  Old records  []  Other:  Most notable findings include:     CT Head Without Contrast    Result Date: 5/27/2023  CT OF THE HEAD WITHOUT CONTRAST  HISTORY: Mental status changes and hypotension  COMPARISON: None available.  TECHNIQUE: Axial CT imaging was obtained through the brain. No IV contrast was administered.  FINDINGS: No acute intracranial hemorrhage is seen. Ventricles are normal in size. There is no midline shift or mass effect. There is persistent opacification of the ethmoid sinuses, as well as an air-fluid level within the left maxillary sinus. Correlation with any evidence of sinusitis is recommended.      No acute intracranial findings. Sinus inflammatory changes.  Radiation dose reduction techniques were utilized, including automated exposure control and exposure modulation based on body size.  This report was finalized on 5/27/2023 12:43 AM by Dr. Sharron Anand M.D.      XR Chest 1 View    Result Date: 5/27/2023  SINGLE VIEW OF THE CHEST  HISTORY: Hypotension  COMPARISON: 01/28/2022  FINDINGS: Heart size is within normal limits. No pneumothorax, pleural effusion, or acute infiltrate is seen.      No acute findings.  This report was finalized on 5/27/2023 12:07 AM by Dr. Sharron Anand M.D.        Assessment & Plan   Assessment / Plan     Brief Patient Summary:  Wm Mandel is a 62 y.o. male who is being admitted to the observation unit for further evaluation of near syncope and significant hypotension.  Plan for echocardiogram and cardiology consultation.    Active Hospital Problems:  Active Hospital Problems    Diagnosis    • **Near syncope      Plan:     Near syncope  Hypotension  Cardiomyopathy  -CT head negative acute  -Chest x-ray negative acute  -high-sensitivity troponin negative x2  -EKG shows sinus rhythm, no acute ischemia  -Echocardiogram ordered  -Orthostatics ordered  -Hold  antihypertensives at this time  -Cardiology consulted  -Monitor vital signs every 4 hours  -fall precautions  -Telemetry monitoring    Acute on chronic kidney disease  Hyponatremia  -Creatinine appears elevated at 1.73, baseline appears about 1.4  -Sodium 133  -Patient received a liter of fluids in the ER  -Trend with repeat BMP at 10 AM  -Avoid nephrotoxic medications    Pulmonary sarcoidosis  -No acute issues  -Albuterol as needed    Hyperlipidemia  -Continue statin    BPH  -Suprapubic catheter in place  -Continue Flomax    DVT prophylaxis:  Mechanical DVT prophylaxis orders are present.    CODE STATUS:    Code Status (Patient has no pulse and is not breathing): CPR (Attempt to Resuscitate)  Medical Interventions (Patient has pulse or is breathing): Full Support    Admission Status:  I believe this patient meets observation status.    78 minutes have been spent by [facility] providers in the care of this patient while under observation status.      I wore an face mask, eye protection, and gloves during this patient encounter. Patient also wearing a surgical mask. Hand hygeine performed before and after seeing the patient.      Electronically signed by Nakia Hill PA-C, 05/27/23, 2:28 AM EDT.

## 2023-05-27 NOTE — ED PROVIDER NOTES
EMERGENCY DEPARTMENT ENCOUNTER    Room Number:  24/24  Date seen:  5/27/2023  PCP: Dale Andrade MD  Historian: Patient       HPI:  Chief Complaint: Near syncope  A complete HPI/ROS/PMH/PSH/SH/FH are unobtainable due to: None  Context: Wm Mandel is a 62 y.o. male who presents to the ED c/o sudden onset of a near syncopal episode that occurred just prior to ED arrival today.  The patient reports that he had not eaten since this morning.  He suddenly began having tunnel vision type sensation with associated lightheadedness.  His blood pressure was checked at that point and it was in the 70s systolic.  He denied any associated chest pain, headache, nausea/vomiting, back pain, abdominal pain, or fever/chills.  In route, his blood pressure did begin to normalize and he is currently without physical complaint.            PAST MEDICAL HISTORY  Active Ambulatory Problems     Diagnosis Date Noted   • Pulmonary sarcoidosis 08/14/2020   • Onychomycosis 08/14/2020   • Benign essential hypertension 11/18/2020   • Benign prostatic hyperplasia 08/14/2020   • Decreased cardiac ejection fraction 03/01/2022   • Vitamin D deficiency 03/01/2022   • Stage 3a chronic kidney disease (CKD) 09/15/2022   • Other hyperlipidemia 03/16/2023     Resolved Ambulatory Problems     Diagnosis Date Noted   • No Resolved Ambulatory Problems     Past Medical History:   Diagnosis Date   • Leukocytoclastic vasculitis    • Tremor          PAST SURGICAL HISTORY  Past Surgical History:   Procedure Laterality Date   • COLONOSCOPY  10/13/2020   • INTERSTIM PLACEMENT  2023   • LUNG SURGERY     • PROSTATE SURGERY     • SUPRAPUBIC CATHETER INSERTION           FAMILY HISTORY  Family History   Problem Relation Age of Onset   • Cancer Mother    • Other Father         gunshot         SOCIAL HISTORY  Social History     Socioeconomic History   • Marital status: Single   Tobacco Use   • Smoking status: Never   • Smokeless tobacco: Never   • Tobacco  comments:     caffeine use- denies   Substance and Sexual Activity   • Alcohol use: Never   • Drug use: Never   • Sexual activity: Yes     Birth control/protection: Condom         ALLERGIES  Shingrix [zoster vac recomb adjuvanted]        REVIEW OF SYSTEMS  Review of Systems   Constitutional: Negative for activity change, appetite change and fever.   HENT: Negative for congestion and sore throat.    Eyes: Positive for visual disturbance.   Respiratory: Negative for cough and shortness of breath.    Cardiovascular: Negative for chest pain and leg swelling.   Gastrointestinal: Negative for abdominal pain, diarrhea and vomiting.   Endocrine: Negative.    Genitourinary: Negative for decreased urine volume and dysuria.   Musculoskeletal: Negative for neck pain.   Skin: Negative for rash and wound.   Allergic/Immunologic: Negative.    Neurological: Negative for weakness, numbness and headaches.        Near syncope   Hematological: Negative.    Psychiatric/Behavioral: Negative.    All other systems reviewed and are negative.         PHYSICAL EXAM  ED Triage Vitals [05/26/23 0447]   Temp Heart Rate Resp BP SpO2   -- 56 18 90/62 100 %      Temp src Heart Rate Source Patient Position BP Location FiO2 (%)   -- Monitor Sitting Right arm --       Physical Exam  Vitals and nursing note reviewed.   Constitutional:       General: He is not in acute distress.  HENT:      Head: Normocephalic and atraumatic.   Eyes:      Pupils: Pupils are equal, round, and reactive to light.   Cardiovascular:      Rate and Rhythm: Normal rate and regular rhythm.      Heart sounds: Normal heart sounds.   Pulmonary:      Effort: Pulmonary effort is normal. No respiratory distress.      Breath sounds: Normal breath sounds.   Abdominal:      Palpations: Abdomen is soft.      Tenderness: There is no abdominal tenderness. There is no guarding or rebound.   Musculoskeletal:         General: Normal range of motion.      Cervical back: Normal range of motion  and neck supple.   Skin:     General: Skin is warm and dry.   Neurological:      Mental Status: He is alert and oriented to person, place, and time.      Sensory: Sensation is intact.   Psychiatric:         Mood and Affect: Mood and affect normal.       Vital signs and nursing notes reviewed.          LAB RESULTS  Recent Results (from the past 24 hour(s))   Comprehensive Metabolic Panel    Collection Time: 05/26/23 11:41 PM    Specimen: Blood   Result Value Ref Range    Glucose 89 65 - 99 mg/dL    BUN 25 (H) 8 - 23 mg/dL    Creatinine 1.71 (H) 0.76 - 1.27 mg/dL    Sodium 133 (L) 136 - 145 mmol/L    Potassium 4.6 3.5 - 5.2 mmol/L    Chloride 98 98 - 107 mmol/L    CO2 24.2 22.0 - 29.0 mmol/L    Calcium 9.0 8.6 - 10.5 mg/dL    Total Protein 7.3 6.0 - 8.5 g/dL    Albumin 4.3 3.5 - 5.2 g/dL    ALT (SGPT) 14 1 - 41 U/L    AST (SGOT) 15 1 - 40 U/L    Alkaline Phosphatase 61 39 - 117 U/L    Total Bilirubin 0.8 0.0 - 1.2 mg/dL    Globulin 3.0 gm/dL    A/G Ratio 1.4 g/dL    BUN/Creatinine Ratio 14.6 7.0 - 25.0    Anion Gap 10.8 5.0 - 15.0 mmol/L    eGFR 44.4 (L) >60.0 mL/min/1.73   High Sensitivity Troponin T    Collection Time: 05/26/23 11:41 PM    Specimen: Blood   Result Value Ref Range    HS Troponin T 14 <15 ng/L   CBC Auto Differential    Collection Time: 05/26/23 11:41 PM    Specimen: Blood   Result Value Ref Range    WBC 8.75 3.40 - 10.80 10*3/mm3    RBC 4.75 4.14 - 5.80 10*6/mm3    Hemoglobin 13.7 13.0 - 17.7 g/dL    Hematocrit 39.9 37.5 - 51.0 %    MCV 84.0 79.0 - 97.0 fL    MCH 28.8 26.6 - 33.0 pg    MCHC 34.3 31.5 - 35.7 g/dL    RDW 12.7 12.3 - 15.4 %    RDW-SD 38.4 37.0 - 54.0 fl    MPV 9.9 6.0 - 12.0 fL    Platelets 249 140 - 450 10*3/mm3    Neutrophil % 73.2 42.7 - 76.0 %    Lymphocyte % 16.3 (L) 19.6 - 45.3 %    Monocyte % 8.1 5.0 - 12.0 %    Eosinophil % 1.8 0.3 - 6.2 %    Basophil % 0.3 0.0 - 1.5 %    Immature Grans % 0.3 0.0 - 0.5 %    Neutrophils, Absolute 6.39 1.70 - 7.00 10*3/mm3    Lymphocytes, Absolute  1.43 0.70 - 3.10 10*3/mm3    Monocytes, Absolute 0.71 0.10 - 0.90 10*3/mm3    Eosinophils, Absolute 0.16 0.00 - 0.40 10*3/mm3    Basophils, Absolute 0.03 0.00 - 0.20 10*3/mm3    Immature Grans, Absolute 0.03 0.00 - 0.05 10*3/mm3    nRBC 0.0 0.0 - 0.2 /100 WBC   ECG 12 Lead Syncope    Collection Time: 05/26/23 11:48 PM   Result Value Ref Range    QT Interval 415 ms   High Sensitivity Troponin T 2Hr    Collection Time: 05/27/23  1:52 AM    Specimen: Blood   Result Value Ref Range    HS Troponin T 14 <15 ng/L    Troponin T Delta 0 >=-4 - <+4 ng/L       Ordered the above labs and reviewed the results.        RADIOLOGY  CT Head Without Contrast    Result Date: 5/27/2023  CT OF THE HEAD WITHOUT CONTRAST  HISTORY: Mental status changes and hypotension  COMPARISON: None available.  TECHNIQUE: Axial CT imaging was obtained through the brain. No IV contrast was administered.  FINDINGS: No acute intracranial hemorrhage is seen. Ventricles are normal in size. There is no midline shift or mass effect. There is persistent opacification of the ethmoid sinuses, as well as an air-fluid level within the left maxillary sinus. Correlation with any evidence of sinusitis is recommended.      No acute intracranial findings. Sinus inflammatory changes.  Radiation dose reduction techniques were utilized, including automated exposure control and exposure modulation based on body size.  This report was finalized on 5/27/2023 12:43 AM by Dr. Sharron Anand M.D.      XR Chest 1 View    Result Date: 5/27/2023  SINGLE VIEW OF THE CHEST  HISTORY: Hypotension  COMPARISON: 01/28/2022  FINDINGS: Heart size is within normal limits. No pneumothorax, pleural effusion, or acute infiltrate is seen.      No acute findings.  This report was finalized on 5/27/2023 12:07 AM by Dr. Sharron Anand M.D.        Ordered the above noted radiological studies. Reviewed by me in PACS.            PROCEDURES  Procedures    EKG independently interpreted by myself  as follows:    EKG          EKG time: 2348  Rhythm/Rate: NSR, 59  P waves and ME: nml  QRS, axis: nml, nml   ST and T waves: nml     Interpreted Contemporaneously by me, independently viewed  unchanged compared to prior 3/9/23            MEDICATIONS GIVEN IN ER  Medications   sodium chloride 0.9 % flush 10 mL (has no administration in time range)   sodium chloride 0.9 % flush 10 mL (has no administration in time range)   sodium chloride 0.9 % flush 10 mL (has no administration in time range)   sodium chloride 0.9 % infusion 40 mL (has no administration in time range)   sennosides-docusate (PERICOLACE) 8.6-50 MG per tablet 2 tablet (has no administration in time range)     And   polyethylene glycol (MIRALAX) packet 17 g (has no administration in time range)     And   bisacodyl (DULCOLAX) EC tablet 5 mg (has no administration in time range)     And   bisacodyl (DULCOLAX) suppository 10 mg (has no administration in time range)   ondansetron (ZOFRAN) tablet 4 mg (has no administration in time range)     Or   ondansetron (ZOFRAN) injection 4 mg (has no administration in time range)   nitroglycerin (NITROSTAT) SL tablet 0.4 mg (has no administration in time range)   acetaminophen (TYLENOL) tablet 650 mg (has no administration in time range)   melatonin tablet 5 mg (has no administration in time range)   sodium chloride 0.9 % infusion (has no administration in time range)   sodium chloride 0.9 % bolus 1,000 mL (0 mL Intravenous Stopped 5/27/23 0131)                   MEDICAL DECISION MAKING, PROGRESS, and CONSULTS    All labs have been independently reviewed by me.  All radiology studies have been reviewed by me and I have also reviewed the radiology report.   EKG's independently viewed and interpreted by me.  Discussion below represents my analysis of pertinent findings related to patient's condition, differential diagnosis, treatment plan and final disposition.      Additional sources:  - Discussed/ obtained information  from independent historians: History obtained from the EMS report as well as the patient himself at bedside    - External (non-ED) record review: Upon medical records review, the patient was last seen and evaluated in the office of his primary care provider on 5/19/2023 for follow-up for his known hypertension.    - Chronic or social conditions impacting care: Hypertension with medication management    - Shared decision making: Admission decision based on shared conversations have between myself as well as the patient at bedside.    Case discussed with Tamela Hill PA-C, who will admit the patient to the observation unit today with Dr. Carlin.      Orders placed during this visit:  Orders Placed This Encounter   Procedures   • XR Chest 1 View   • CT Head Without Contrast   • Comprehensive Metabolic Panel   • High Sensitivity Troponin T   • CBC Auto Differential   • High Sensitivity Troponin T 2Hr   • CBC (No Diff)   • Basic Metabolic Panel   • NPO Diet NPO Type: Sips with Meds   • Intake & Output   • Weigh Patient   • Oral Care   • Telemetry - Maintain IV Access   • Telemetry - Place Orders & Notify Provider of Results When Patient Experiences Acute Chest Pain, Dysrhythmia or Respiratory Distress   • May Be Off Telemetry for Tests   • Vital Signs   • Pulse Oximetry, Continuous   • Up With Assistance   • Notify Provider (With Default Parameters)   • Code Status and Medical Interventions:   • Inpatient Cardiology Consult   • ECG 12 Lead Syncope   • Insert Peripheral IV   • Insert Peripheral IV   • Initiate ED Observation Status   • CBC & Differential           Differential diagnosis includes but is not limited to:    Differential diagnosis includes but is not limited to acute coronary syndrome, cardiac rhythm disturbance, acute anemia, dehydration, or severe electrolyte disturbance.      Independent interpretation of labs, radiology studies, and discussions with consultants:    Chest x-ray independently  interpreted by myself with my interpretation as no cardiomegaly with a normal mediastinum, no edema, no infiltrate.        ED Course as of 05/27/23 0240   Sat May 27, 2023   0136 On reevaluation, the patient is resting comfortably and reports significant improvement in symptoms.  I did inform him that his work-up thus far does reveal slight elevation in his cardiac enzymes however otherwise negative.  Given his near syncope and hypotension initially, I would like to admit him to the observation unit today for further monitoring and treatment.  The patient is in agreement with that plan and all questions have been answered. [BM]   0146 The patient's presentation, work-up, as well as diagnosis and treatment plan were discussed at length with Tamela Hill PA-C.  She agrees to admit the patient to the observation unit today with Dr. Carlin. [BM]      ED Course User Index  [BM] Juan Manuel Mckeon MD             DIAGNOSIS  Final diagnoses:   Near syncope   Hypotension, unspecified hypotension type   Elevated troponin   KATALINA (acute kidney injury)         DISPOSITION  ADMISSION    Discussed treatment plan and reason for admission with pt/family and admitting physician.  Pt/family voiced understanding of the plan for admission for further testing/treatment as needed.                 Latest Documented Vital Signs:  As of 02:40 EDT  BP- 120/85 HR- 68 Temp- 98.3 °F (36.8 °C) (Oral) O2 sat- 99%              --    Please note that portions of this were completed with a voice recognition program.       Note Disclaimer: At [facility], we believe that sharing information builds trust and better relationships. You are receiving this note because you are receiving care at [facility] or recently visited. It is possible you will see health information before a provider has talked with you about it. This kind of information can be easy to misunderstand. To help you fully understand what it means for your health, we urge  you to discuss this note with your provider.             Juan Manuel Mckeon MD  05/27/23 1416

## 2023-05-27 NOTE — PROGRESS NOTES
Echocardiogram reviewed, normal:  Results for orders placed during the hospital encounter of 05/26/23    Adult Transthoracic Echo Complete W/ Cont if Necessary Per Protocol    Interpretation Summary  •  Left ventricular systolic function is normal. Calculated left ventricular EF = 55.1%  •  Left ventricular diastolic function was normal.  •  Estimated right ventricular systolic pressure from tricuspid regurgitation is normal (<35 mmHg).    No active cardiac issues, we will sign off, please call if we can help in any way

## 2023-05-27 NOTE — ED NOTES
Pt arrived by EMS from home reporting dizziness and hypotension. Pt had loss of bladder at the time of dizziness. Pt was ambulatory at home and is alert and oriented.

## 2023-05-27 NOTE — PLAN OF CARE
Goal Outcome Evaluation:              Outcome Evaluation: patient left observation unit at this time via private vehicle with all his belongings, discharge summary provided and education inlcuded, aware to follow up with primary care as indicated, VSS and had no other questions at the time of discharge.

## 2023-05-27 NOTE — PLAN OF CARE
Goal Outcome Evaluation:   Patient admitted for a near syncopal episode.   Outcome summary: Patient arrived to OBS around 0400. Orthos were negative. AO x4, up with stand-by assistance, room air. Echo this AM. Cardiology to see today.

## 2023-05-29 ENCOUNTER — TRANSITIONAL CARE MANAGEMENT TELEPHONE ENCOUNTER (OUTPATIENT)
Dept: CALL CENTER | Facility: HOSPITAL | Age: 63
End: 2023-05-29

## 2023-05-29 NOTE — OUTREACH NOTE
Call Center TCM Note    Flowsheet Row Responses   [facility] patient discharged from? [place]   Does the patient have one of the following disease processes/diagnoses(primary or secondary)? Other   TCM attempt successful? Yes  [No current verbal release]   Call start time 1556   Call end time 1606   Person spoke with today (if not patient) and relationship patient   Meds reviewed with patient/caregiver? Yes   Is the patient having any side effects they believe may be caused by any medication additions or changes? No   Does the patient have all medications ordered at discharge? Yes   Is the patient taking all medications as directed (includes completed medication regime)? Yes   Comments No available appts within 2 week window.  Pt plans to call the office tomorrow to schedule hosp f/u.  TCM complete.   Does the patient have an appointment with their PCP within 7 days of discharge? No appointments available   Nursing Interventions PCP office requested to make appointment - message sent   Psychosocial issues? No   Did the patient receive a copy of their discharge instructions? Yes   Nursing interventions Reviewed instructions with patient   What is the patient's perception of their health status since discharge? Improving   Is the patient/caregiver able to teach back the hierarchy of who to call/visit for symptoms/problems? PCP, Specialist, Home health nurse, Urgent Care, ED, 911 Yes   If the patient is a current smoker, are they able to teach back resources for cessation? Not a smoker   TCM call completed? Yes   Wrap up additional comments Pt is doing well at this time.  He denies needs.   Call end time 1606   Would this patient benefit from a Referral to Amb Social Work? No   Is the patient interested in additional calls from an ambulatory ?  NOTE:  applies to high risk patients requiring additional follow-up. No          Lorena Pérez RN    5/29/2023, 16:06 EDT

## 2023-05-29 NOTE — PAYOR COMM NOTE
"Record, Brittanie MESA (62 y.o. Male)     ATTN: REQUESTING OBSERVATION AUTHORIZATION: ID# [id number]     DEPT: [phone],  [phone]    [facility]: NPI [id number]  [facility]# [id number]        Date of Birth   1960    Social Security Number       Address   [address] [place] [address]    Home Phone   [phone]    MRN   [id number]       Taoist   Latter day    Marital Status   Single                            Admission Date   5/26/23    Admission Type   Emergency    Admitting Provider   Montez Carlin MD    Attending Provider       Department, Room/Bed   [facility] OBSERVATION, 117/1       Discharge Date   5/27/2023    Discharge Disposition   Home or Self Care    Discharge Destination                               Attending Provider: (none)   Allergies: Shingrix [Zoster Vac Recomb Adjuvanted]    Isolation: None   Infection: None   Code Status: Prior    Ht: 175.3 cm (69\")   Wt: 67.1 kg (148 lb)    Admission Cmt: None   Principal Problem: Near syncope [R55]                 Active Insurance as of 5/26/2023     Primary Coverage     Payor Plan Insurance Group Employer/Plan Group    [facility] MEDICARE REPLACEMENT [employer] MEDICARE REPLACEMENT [id number]     Payor Plan Address Payor Plan Phone Number Payor Plan Fax Number Effective Dates    [address] [address] [phone]  6/16/2022 - None Entered    [facility] [id number]       Subscriber Name Subscriber Birth Date Member ID       RECORDBRITTANIE 1960 [id number]                 Emergency Contacts      (Rel.) Home Phone Work Phone Mobile Phone    HELENA BLUNT (Other) -- -- [phone]    GEORGIA BRANHAM (Sister) -- -- [phone]               History & Physical      LizNakia PA-C at 05/27/23 0228     Attestation signed by Montez Carlin MD at 05/27/23 5543      MD ATTESTATION NOTE    The JAN and I have discussed this patient's history, physical exam, and treatment plan.  I " have reviewed the documentation and personally had a face to face interaction with the patient. I affirm the documentation and agree with the treatment and plan.  The attached note describes my personal findings.      I provided a substantive portion of the care of the patient.  I personally performed the physical exam in its entirety, and below are my findings.  For this patient encounter, the patient wore surgical mask, I wore full protective PPE including N95 and eye protection.      Brief HPI: Patient presents for evaluation of near syncope.  Patient states symptoms happened while he was cooking.  Has had no prior episodes of syncope.  Patient states felt lightheaded and needed to sit down.  Had no chest pain pressure tightness.  Patient states he feels pretty good now.  Has had no symptoms since he has been here.  Has been up walking around.    PHYSICAL EXAM  ED Triage Vitals   Temp Heart Rate Resp BP SpO2   23 0024 23   98.3 °F (36.8 °C) 56 18 90/62 100 %      Temp src Heart Rate Source Patient Position BP Location FiO2 (%)   23 0024 23 --   Oral Monitor Sitting Right arm          GENERAL: no acute distress  HENT: nares patent  EYES: no scleral icterus  CV: regular rhythm, normal rate  RESPIRATORY: normal effort  ABDOMEN: soft  MUSCULOSKELETAL: no deformity  NEURO: alert, moves all extremities, follows commands  PSYCH:  calm, cooperative  SKIN: warm, dry    Vital signs and nursing notes reviewed.        Plan: Cardiology consult.  Echo                     [facility]   HISTORY AND PHYSICAL    Patient Name: Wm Mandel  : 1960  MRN: [id number]  Primary Care Physician:  Dale Andrade MD  Date of admission: 2023    Subjective    Subjective     Chief Complaint:   Chief Complaint   Patient presents with   • Hypotension         HPI:    Wm Mandel is a 62 y.o. male with a history of  cardiomyopathy, hypertension, pulmonary sarcoidosis, CKD, BPH, and  hyperlipidemia who presents to [facility] ER after a near syncopal episode at home.  Patient states he was at home cooking dinner for herself when he became really lightheaded and felt like he was going to pass out.  He states his vision became blurred and he urinated on himself without realizing.  He states he never passed out or lost consciousness but got seated quickly and started to feel better.  He states after a bit he got up to go and changes close still feeling a bit lightheaded and sat down and rest for a bit longer.  He states after about 45 minutes to an hour he was feeling back to normal.  He states he walked over to a neighbors who helped him check his blood pressure and reported it was very low and then called EMS.  He denies any chest pain chest discomfort.  Denies shortness of breath or palpitations.  Denies numbness and tingling or focal weakness.  Denies difficulty with his speech or slurring.  Denies abdominal pain nausea or diarrhea.  Denies recent fevers and chills.    In the ER, CT head without showed no acute intracranial findings.  Chest x-ray shows no acute findings.  2 high-sensitivity troponins were negative in the ER and EKG showed sinus rhythm with no acute ischemia.  Amatory work-up is notable for elevated creatinine of 1.71 and a low sodium of 133.  Was given a liter of IV fluids in the ER.    Review of Systems   All systems were reviewed and negative except for: what is mentioned above in the HPI.     Personal History     Past Medical History:   Diagnosis Date   • Leukocytoclastic vasculitis    • Tremor     seen at        Past Surgical History:   Procedure Laterality Date   • COLONOSCOPY  10/13/2020   • INTERSTIM PLACEMENT  2023   • LUNG SURGERY     • PROSTATE SURGERY     • SUPRAPUBIC CATHETER INSERTION         Family History: family history includes Cancer in his mother; Other in his father. Otherwise  pertinent FHx was reviewed and not pertinent to current issue.    Social History:  reports that he has never smoked. He has never used smokeless tobacco. He reports that he does not drink alcohol and does not use drugs.    Home Medications:  Acetaminophen, Tolnaftate, atorvastatin, econazole nitrate, losartan, metoprolol succinate XL, spironolactone, and tamsulosin    Allergies:  Allergies   Allergen Reactions   • Shingrix [Zoster Vac Recomb Adjuvanted] Rash       Objective    Objective     Vitals:   Temp:  [98.3 °F (36.8 °C)] 98.3 °F (36.8 °C)  Heart Rate:  [56-68] 68  Resp:  [15-18] 17  BP: ()/(62-85) 120/85  Physical Exam    Constitutional: 62-year-old male no acute distress on room air   Eyes: PERRLA, sclerae anicteric, no conjunctival injection   HENT: NCAT, mucous membranes moist   Neck: Supple, no thyromegaly, no lymphadenopathy, trachea midline   Respiratory: Clear to auscultation bilaterally, nonlabored respirations    Cardiovascular: RRR, no murmurs, rubs, or gallops, palpable pedal pulses bilaterally   Gastrointestinal: Positive bowel sounds, soft, nontender, nondistended   Musculoskeletal: No bilateral ankle edema, no clubbing or cyanosis to extremities   Psychiatric: Appropriate affect, cooperative   Neurologic: Oriented x 3, strength symmetric in all extremities, Cranial Nerves grossly intact to confrontation, speech clear   Skin: No rashes     Result Review    Result Review:  I have personally reviewed the results from the time of this admission to 5/27/2023 02:28 EDT and agree with these findings:  [x]  Laboratory list / accordion  []  Microbiology  [x]  Radiology  [x]  EKG/Telemetry   []  Cardiology/Vascular   []  Pathology  [x]  Old records  []  Other:  Most notable findings include:     CT Head Without Contrast    Result Date: 5/27/2023  CT OF THE HEAD WITHOUT CONTRAST  HISTORY: Mental status changes and hypotension  COMPARISON: None available.  TECHNIQUE: Axial CT imaging was obtained  through the brain. No IV contrast was administered.  FINDINGS: No acute intracranial hemorrhage is seen. Ventricles are normal in size. There is no midline shift or mass effect. There is persistent opacification of the ethmoid sinuses, as well as an air-fluid level within the left maxillary sinus. Correlation with any evidence of sinusitis is recommended.      No acute intracranial findings. Sinus inflammatory changes.  Radiation dose reduction techniques were utilized, including automated exposure control and exposure modulation based on body size.  This report was finalized on 5/27/2023 12:43 AM by Dr. Sharron Anand M.D.      XR Chest 1 View    Result Date: 5/27/2023  SINGLE VIEW OF THE CHEST  HISTORY: Hypotension  COMPARISON: 01/28/2022  FINDINGS: Heart size is within normal limits. No pneumothorax, pleural effusion, or acute infiltrate is seen.      No acute findings.  This report was finalized on 5/27/2023 12:07 AM by Dr. Sharron Anand M.D.        Assessment & Plan   Assessment / Plan     Brief Patient Summary:  Wm Mandel is a 62 y.o. male who is being admitted to the observation unit for further evaluation of near syncope and significant hypotension.  Plan for echocardiogram and cardiology consultation.    Active Hospital Problems:  Active Hospital Problems    Diagnosis    • **Near syncope      Plan:     Near syncope  Hypotension  Cardiomyopathy  -CT head negative acute  -Chest x-ray negative acute  -high-sensitivity troponin negative x2  -EKG shows sinus rhythm, no acute ischemia  -Echocardiogram ordered  -Orthostatics ordered  -Hold antihypertensives at this time  -Cardiology consulted  -Monitor vital signs every 4 hours  -fall precautions  -Telemetry monitoring    Acute on chronic kidney disease  Hyponatremia  -Creatinine appears elevated at 1.73, baseline appears about 1.4  -Sodium 133  -Patient received a liter of fluids in the ER  -Trend with repeat BMP at 10 AM  -Avoid nephrotoxic  medications    Pulmonary sarcoidosis  -No acute issues  -Albuterol as needed    Hyperlipidemia  -Continue statin    BPH  -Suprapubic catheter in place  -Continue Flomax    DVT prophylaxis:  Mechanical DVT prophylaxis orders are present.    CODE STATUS:    Code Status (Patient has no pulse and is not breathing): CPR (Attempt to Resuscitate)  Medical Interventions (Patient has pulse or is breathing): Full Support    Admission Status:  I believe this patient meets observation status.    78 minutes have been spent by [facility] providers in the care of this patient while under observation status.      I wore an face mask, eye protection, and gloves during this patient encounter. Patient also wearing a surgical mask. Hand hygeine performed before and after seeing the patient.      Electronically signed by Nakia Hill PA-C, 05/27/23, 2:28 AM EDT.             Electronically signed by Montez Carlin MD at 05/27/23 0756          Emergency Department Notes      Aleksandra Egan RN at 05/26/23 2325        Pt arrived by EMS from home reporting dizziness and hypotension. Pt had loss of bladder at the time of dizziness. Pt was ambulatory at home and is alert and oriented.     Electronically signed by Aleksandra Egan RN at 05/26/23 2326     Juan Manuel Mckeon MD at 05/26/23 2340           EMERGENCY DEPARTMENT ENCOUNTER    Room Number:  24/24  Date seen:  5/27/2023  PCP: Dale Andrade MD  Historian: Patient       HPI:  Chief Complaint: Near syncope  A complete HPI/ROS/PMH/PSH/SH/FH are unobtainable due to: None  Context: Wm Mandel is a 62 y.o. male who presents to the ED c/o sudden onset of a near syncopal episode that occurred just prior to ED arrival today.  The patient reports that he had not eaten since this morning.  He suddenly began having tunnel vision type sensation with associated lightheadedness.  His blood pressure was checked at that point and it was in  the 70s systolic.  He denied any associated chest pain, headache, nausea/vomiting, back pain, abdominal pain, or fever/chills.  In route, his blood pressure did begin to normalize and he is currently without physical complaint.            PAST MEDICAL HISTORY  Active Ambulatory Problems     Diagnosis Date Noted   • Pulmonary sarcoidosis 08/14/2020   • Onychomycosis 08/14/2020   • Benign essential hypertension 11/18/2020   • Benign prostatic hyperplasia 08/14/2020   • Decreased cardiac ejection fraction 03/01/2022   • Vitamin D deficiency 03/01/2022   • Stage 3a chronic kidney disease (CKD) 09/15/2022   • Other hyperlipidemia 03/16/2023     Resolved Ambulatory Problems     Diagnosis Date Noted   • No Resolved Ambulatory Problems     Past Medical History:   Diagnosis Date   • Leukocytoclastic vasculitis    • Tremor          PAST SURGICAL HISTORY  Past Surgical History:   Procedure Laterality Date   • COLONOSCOPY  10/13/2020   • INTERSTIM PLACEMENT  2023   • LUNG SURGERY     • PROSTATE SURGERY     • SUPRAPUBIC CATHETER INSERTION           FAMILY HISTORY  Family History   Problem Relation Age of Onset   • Cancer Mother    • Other Father         gunshot         SOCIAL HISTORY  Social History     Socioeconomic History   • Marital status: Single   Tobacco Use   • Smoking status: Never   • Smokeless tobacco: Never   • Tobacco comments:     caffeine use- denies   Substance and Sexual Activity   • Alcohol use: Never   • Drug use: Never   • Sexual activity: Yes     Birth control/protection: Condom         ALLERGIES  Shingrix [zoster vac recomb adjuvanted]        REVIEW OF SYSTEMS  Review of Systems   Constitutional: Negative for activity change, appetite change and fever.   HENT: Negative for congestion and sore throat.    Eyes: Positive for visual disturbance.   Respiratory: Negative for cough and shortness of breath.    Cardiovascular: Negative for chest pain and leg swelling.   Gastrointestinal: Negative for abdominal pain,  diarrhea and vomiting.   Endocrine: Negative.    Genitourinary: Negative for decreased urine volume and dysuria.   Musculoskeletal: Negative for neck pain.   Skin: Negative for rash and wound.   Allergic/Immunologic: Negative.    Neurological: Negative for weakness, numbness and headaches.        Near syncope   Hematological: Negative.    Psychiatric/Behavioral: Negative.    All other systems reviewed and are negative.         PHYSICAL EXAM  ED Triage Vitals [05/26/23 2327]   Temp Heart Rate Resp BP SpO2   -- 56 18 90/62 100 %      Temp src Heart Rate Source Patient Position BP Location FiO2 (%)   -- Monitor Sitting Right arm --       Physical Exam  Vitals and nursing note reviewed.   Constitutional:       General: He is not in acute distress.  HENT:      Head: Normocephalic and atraumatic.   Eyes:      Pupils: Pupils are equal, round, and reactive to light.   Cardiovascular:      Rate and Rhythm: Normal rate and regular rhythm.      Heart sounds: Normal heart sounds.   Pulmonary:      Effort: Pulmonary effort is normal. No respiratory distress.      Breath sounds: Normal breath sounds.   Abdominal:      Palpations: Abdomen is soft.      Tenderness: There is no abdominal tenderness. There is no guarding or rebound.   Musculoskeletal:         General: Normal range of motion.      Cervical back: Normal range of motion and neck supple.   Skin:     General: Skin is warm and dry.   Neurological:      Mental Status: He is alert and oriented to person, place, and time.      Sensory: Sensation is intact.   Psychiatric:         Mood and Affect: Mood and affect normal.       Vital signs and nursing notes reviewed.          LAB RESULTS  Recent Results (from the past 24 hour(s))   Comprehensive Metabolic Panel    Collection Time: 05/26/23 11:41 PM    Specimen: Blood   Result Value Ref Range    Glucose 89 65 - 99 mg/dL    BUN 25 (H) 8 - 23 mg/dL    Creatinine 1.71 (H) 0.76 - 1.27 mg/dL    Sodium 133 (L) 136 - 145 mmol/L     Potassium 4.6 3.5 - 5.2 mmol/L    Chloride 98 98 - 107 mmol/L    CO2 24.2 22.0 - 29.0 mmol/L    Calcium 9.0 8.6 - 10.5 mg/dL    Total Protein 7.3 6.0 - 8.5 g/dL    Albumin 4.3 3.5 - 5.2 g/dL    ALT (SGPT) 14 1 - 41 U/L    AST (SGOT) 15 1 - 40 U/L    Alkaline Phosphatase 61 39 - 117 U/L    Total Bilirubin 0.8 0.0 - 1.2 mg/dL    Globulin 3.0 gm/dL    A/G Ratio 1.4 g/dL    BUN/Creatinine Ratio 14.6 7.0 - 25.0    Anion Gap 10.8 5.0 - 15.0 mmol/L    eGFR 44.4 (L) >60.0 mL/min/1.73   High Sensitivity Troponin T    Collection Time: 05/26/23 11:41 PM    Specimen: Blood   Result Value Ref Range    HS Troponin T 14 <15 ng/L   CBC Auto Differential    Collection Time: 05/26/23 11:41 PM    Specimen: Blood   Result Value Ref Range    WBC 8.75 3.40 - 10.80 10*3/mm3    RBC 4.75 4.14 - 5.80 10*6/mm3    Hemoglobin 13.7 13.0 - 17.7 g/dL    Hematocrit 39.9 37.5 - 51.0 %    MCV 84.0 79.0 - 97.0 fL    MCH 28.8 26.6 - 33.0 pg    MCHC 34.3 31.5 - 35.7 g/dL    RDW 12.7 12.3 - 15.4 %    RDW-SD 38.4 37.0 - 54.0 fl    MPV 9.9 6.0 - 12.0 fL    Platelets 249 140 - 450 10*3/mm3    Neutrophil % 73.2 42.7 - 76.0 %    Lymphocyte % 16.3 (L) 19.6 - 45.3 %    Monocyte % 8.1 5.0 - 12.0 %    Eosinophil % 1.8 0.3 - 6.2 %    Basophil % 0.3 0.0 - 1.5 %    Immature Grans % 0.3 0.0 - 0.5 %    Neutrophils, Absolute 6.39 1.70 - 7.00 10*3/mm3    Lymphocytes, Absolute 1.43 0.70 - 3.10 10*3/mm3    Monocytes, Absolute 0.71 0.10 - 0.90 10*3/mm3    Eosinophils, Absolute 0.16 0.00 - 0.40 10*3/mm3    Basophils, Absolute 0.03 0.00 - 0.20 10*3/mm3    Immature Grans, Absolute 0.03 0.00 - 0.05 10*3/mm3    nRBC 0.0 0.0 - 0.2 /100 WBC   ECG 12 Lead Syncope    Collection Time: 05/26/23 11:48 PM   Result Value Ref Range    QT Interval 415 ms   High Sensitivity Troponin T 2Hr    Collection Time: 05/27/23  1:52 AM    Specimen: Blood   Result Value Ref Range    HS Troponin T 14 <15 ng/L    Troponin T Delta 0 >=-4 - <+4 ng/L       Ordered the above labs and reviewed the  results.        RADIOLOGY  CT Head Without Contrast    Result Date: 5/27/2023  CT OF THE HEAD WITHOUT CONTRAST  HISTORY: Mental status changes and hypotension  COMPARISON: None available.  TECHNIQUE: Axial CT imaging was obtained through the brain. No IV contrast was administered.  FINDINGS: No acute intracranial hemorrhage is seen. Ventricles are normal in size. There is no midline shift or mass effect. There is persistent opacification of the ethmoid sinuses, as well as an air-fluid level within the left maxillary sinus. Correlation with any evidence of sinusitis is recommended.      No acute intracranial findings. Sinus inflammatory changes.  Radiation dose reduction techniques were utilized, including automated exposure control and exposure modulation based on body size.  This report was finalized on 5/27/2023 12:43 AM by Dr. Sharron Anand M.D.      XR Chest 1 View    Result Date: 5/27/2023  SINGLE VIEW OF THE CHEST  HISTORY: Hypotension  COMPARISON: 01/28/2022  FINDINGS: Heart size is within normal limits. No pneumothorax, pleural effusion, or acute infiltrate is seen.      No acute findings.  This report was finalized on 5/27/2023 12:07 AM by Dr. Sharron Anand M.D.        Ordered the above noted radiological studies. Reviewed by me in PACS.            PROCEDURES  Procedures    EKG independently interpreted by myself as follows:    EKG          EKG time: 2348  Rhythm/Rate: NSR, 59  P waves and VT: nml  QRS, axis: nml, nml   ST and T waves: nml     Interpreted Contemporaneously by me, independently viewed  unchanged compared to prior 3/9/23            MEDICATIONS GIVEN IN ER  Medications   sodium chloride 0.9 % flush 10 mL (has no administration in time range)   sodium chloride 0.9 % flush 10 mL (has no administration in time range)   sodium chloride 0.9 % flush 10 mL (has no administration in time range)   sodium chloride 0.9 % infusion 40 mL (has no administration in time range)   sennosides-docusate  (PERICOLACE) 8.6-50 MG per tablet 2 tablet (has no administration in time range)     And   polyethylene glycol (MIRALAX) packet 17 g (has no administration in time range)     And   bisacodyl (DULCOLAX) EC tablet 5 mg (has no administration in time range)     And   bisacodyl (DULCOLAX) suppository 10 mg (has no administration in time range)   ondansetron (ZOFRAN) tablet 4 mg (has no administration in time range)     Or   ondansetron (ZOFRAN) injection 4 mg (has no administration in time range)   nitroglycerin (NITROSTAT) SL tablet 0.4 mg (has no administration in time range)   acetaminophen (TYLENOL) tablet 650 mg (has no administration in time range)   melatonin tablet 5 mg (has no administration in time range)   sodium chloride 0.9 % infusion (has no administration in time range)   sodium chloride 0.9 % bolus 1,000 mL (0 mL Intravenous Stopped 5/27/23 0131)                   MEDICAL DECISION MAKING, PROGRESS, and CONSULTS    All labs have been independently reviewed by me.  All radiology studies have been reviewed by me and I have also reviewed the radiology report.   EKG's independently viewed and interpreted by me.  Discussion below represents my analysis of pertinent findings related to patient's condition, differential diagnosis, treatment plan and final disposition.      Additional sources:  - Discussed/ obtained information from independent historians: History obtained from the EMS report as well as the patient himself at bedside    - External (non-ED) record review: Upon medical records review, the patient was last seen and evaluated in the office of his primary care provider on 5/19/2023 for follow-up for his known hypertension.    - Chronic or social conditions impacting care: Hypertension with medication management    - Shared decision making: Admission decision based on shared conversations have between myself as well as the patient at bedside.    Case discussed with Tamela Hill PA-C, who will admit  the patient to the observation unit today with Dr. Carlin.      Orders placed during this visit:  Orders Placed This Encounter   Procedures   • XR Chest 1 View   • CT Head Without Contrast   • Comprehensive Metabolic Panel   • High Sensitivity Troponin T   • CBC Auto Differential   • High Sensitivity Troponin T 2Hr   • CBC (No Diff)   • Basic Metabolic Panel   • NPO Diet NPO Type: Sips with Meds   • Intake & Output   • Weigh Patient   • Oral Care   • Telemetry - Maintain IV Access   • Telemetry - Place Orders & Notify Provider of Results When Patient Experiences Acute Chest Pain, Dysrhythmia or Respiratory Distress   • May Be Off Telemetry for Tests   • Vital Signs   • Pulse Oximetry, Continuous   • Up With Assistance   • Notify Provider (With Default Parameters)   • Code Status and Medical Interventions:   • Inpatient Cardiology Consult   • ECG 12 Lead Syncope   • Insert Peripheral IV   • Insert Peripheral IV   • Initiate ED Observation Status   • CBC & Differential           Differential diagnosis includes but is not limited to:    Differential diagnosis includes but is not limited to acute coronary syndrome, cardiac rhythm disturbance, acute anemia, dehydration, or severe electrolyte disturbance.      Independent interpretation of labs, radiology studies, and discussions with consultants:    Chest x-ray independently interpreted by myself with my interpretation as no cardiomegaly with a normal mediastinum, no edema, no infiltrate.        ED Course as of 05/27/23 0240   Sat May 27, 2023   0136 On reevaluation, the patient is resting comfortably and reports significant improvement in symptoms.  I did inform him that his work-up thus far does reveal slight elevation in his cardiac enzymes however otherwise negative.  Given his near syncope and hypotension initially, I would like to admit him to the observation unit today for further monitoring and treatment.  The patient is in agreement with that plan and all  questions have been answered. [BM]   0146 The patient's presentation, work-up, as well as diagnosis and treatment plan were discussed at length with Tamela Hill PA-C.  She agrees to admit the patient to the observation unit today with Dr. Carlin. [BM]      ED Course User Index  [BM] Juan Manuel Mckeon MD             DIAGNOSIS  Final diagnoses:   Near syncope   Hypotension, unspecified hypotension type   Elevated troponin   KATALINA (acute kidney injury)         DISPOSITION  ADMISSION    Discussed treatment plan and reason for admission with pt/family and admitting physician.  Pt/family voiced understanding of the plan for admission for further testing/treatment as needed.                 Latest Documented Vital Signs:  As of 02:40 EDT  BP- 120/85 HR- 68 Temp- 98.3 °F (36.8 °C) (Oral) O2 sat- 99%              --    Please note that portions of this were completed with a voice recognition program.       Note Disclaimer: At [facility], we believe that sharing information builds trust and better relationships. You are receiving this note because you are receiving care at [facility] or recently visited. It is possible you will see health information before a provider has talked with you about it. This kind of information can be easy to misunderstand. To help you fully understand what it means for your health, we urge you to discuss this note with your provider.             Juan Manuel Mckeon MD  05/27/23 0240      Electronically signed by Juan Manuel Mckeon MD at 05/27/23 0240

## 2023-06-05 ENCOUNTER — OFFICE VISIT (OUTPATIENT)
Dept: INTERNAL MEDICINE | Facility: CLINIC | Age: 63
End: 2023-06-05
Payer: MEDICARE

## 2023-06-05 VITALS
DIASTOLIC BLOOD PRESSURE: 80 MMHG | TEMPERATURE: 98 F | SYSTOLIC BLOOD PRESSURE: 100 MMHG | HEIGHT: 69 IN | WEIGHT: 145.6 LBS | BODY MASS INDEX: 21.56 KG/M2

## 2023-06-05 DIAGNOSIS — R21 RASH: ICD-10-CM

## 2023-06-05 DIAGNOSIS — R55 NEAR SYNCOPE: Primary | ICD-10-CM

## 2023-06-05 DIAGNOSIS — I10 BENIGN ESSENTIAL HYPERTENSION: Chronic | ICD-10-CM

## 2023-06-05 DIAGNOSIS — E78.49 OTHER HYPERLIPIDEMIA: Chronic | ICD-10-CM

## 2023-06-05 RX ORDER — LOSARTAN POTASSIUM 25 MG/1
25 TABLET ORAL DAILY
Qty: 90 TABLET | Refills: 1 | Status: SHIPPED | OUTPATIENT
Start: 2023-06-05

## 2023-06-05 RX ORDER — METOPROLOL SUCCINATE 25 MG/1
25 TABLET, EXTENDED RELEASE ORAL DAILY
Qty: 90 TABLET | Refills: 1 | Status: SHIPPED | OUTPATIENT
Start: 2023-06-05

## 2023-06-05 NOTE — PROGRESS NOTES
Transitional Care Follow Up Visit  Subjective     Wm Mandel is a 62 y.o. male who presents for a transitional care management visit.    Within 48 business hours after discharge our office contacted him via telephone to coordinate his care and needs.      I reviewed and discussed the details of that call along with the discharge summary, hospital problems, inpatient lab results, inpatient diagnostic studies, and consultation reports with Wm.     Current outpatient and discharge medications have been reconciled for the patient.  Reviewed by: Dale Andrade MD          5/27/2023     2:05 PM   Date of TCM Phone Call   [facility]   Date of Admission 5/26/2023   Date of Discharge 5/27/2023   Discharge Disposition Home or Self Care     Risk for Readmission (LACE) Score: 3 (5/27/2023  6:01 AM)      Hypertension     Course During Hospital Stay:  [facility]   He was in the hospital for presyncope and he was seen by Dr. Girard; he had his Losartan decreased to 50 mg daily. He is feeling better. He put new batteries in his BP monitor and it is not working now. He denies presyncope, chest pain or dyspnea. He had an EF of 55 percent.    He has a rash in his left groin that is only slightly better with econazole.  The following portions of the patient's history were reviewed and updated as appropriate: allergies, current medications, past family history, past medical history, past social history, past surgical history, and problem list.    Review of Systems    Objective   Physical Exam  Vitals reviewed.   Constitutional:       Appearance: He is well-developed.   HENT:      Head: Normocephalic and atraumatic.   Cardiovascular:      Rate and Rhythm: Normal rate and regular rhythm.      Heart sounds: Normal heart sounds, S1 normal and S2 normal.   Pulmonary:      Effort: Pulmonary effort is normal.      Breath sounds: Normal breath sounds.   Skin:     General: Skin is warm.      Comments: Left groin  with thickening of skin which is paler than surrounding area.   Neurological:      Mental Status: He is alert.   Psychiatric:         Behavior: Behavior normal.       Assessment & Plan   Diagnoses and all orders for this visit:    1. Near syncope (Primary)  Comments:  See #2. Decrease BP meds    2. Benign essential hypertension  -     losartan (Cozaar) 25 MG tablet; Take 1 tablet by mouth Daily.  Dispense: 90 tablet; Refill: 1  -     metoprolol succinate XL (Toprol XL) 25 MG 24 hr tablet; Take 1 tablet by mouth Daily.  Dispense: 90 tablet; Refill: 1    3. Other hyperlipidemia  Comments:  To get cmp and flp in July    4. Rash  Comments:  Derm info given               Reviewed echo, CT head, DC summary and labs. Sounds like BP was too low. He was changed to Losartan 50 in the hospital. He has cmp and flp ordered for July with me. Encouraged him to get [employer] monitor. BP still low so decrease Losartan and Toprol as above.

## 2023-07-03 PROBLEM — R55 NEAR SYNCOPE: Status: RESOLVED | Noted: 2023-05-27 | Resolved: 2023-07-03

## 2023-08-23 ENCOUNTER — OFFICE VISIT (OUTPATIENT)
Dept: CARDIOLOGY | Facility: CLINIC | Age: 63
End: 2023-08-23
Payer: MEDICARE

## 2023-08-23 VITALS
DIASTOLIC BLOOD PRESSURE: 80 MMHG | WEIGHT: 147.8 LBS | HEART RATE: 57 BPM | OXYGEN SATURATION: 94 % | BODY MASS INDEX: 21.89 KG/M2 | HEIGHT: 69 IN | SYSTOLIC BLOOD PRESSURE: 122 MMHG

## 2023-08-23 DIAGNOSIS — I49.3 PVC (PREMATURE VENTRICULAR CONTRACTION): ICD-10-CM

## 2023-08-23 DIAGNOSIS — I10 PRIMARY HYPERTENSION: ICD-10-CM

## 2023-08-23 DIAGNOSIS — I42.9 CARDIOMYOPATHY, UNSPECIFIED TYPE: Primary | ICD-10-CM

## 2023-08-23 PROCEDURE — 99214 OFFICE O/P EST MOD 30 MIN: CPT | Performed by: INTERNAL MEDICINE

## 2023-08-23 PROCEDURE — 3079F DIAST BP 80-89 MM HG: CPT | Performed by: INTERNAL MEDICINE

## 2023-08-23 PROCEDURE — 1160F RVW MEDS BY RX/DR IN RCRD: CPT | Performed by: INTERNAL MEDICINE

## 2023-08-23 PROCEDURE — 1159F MED LIST DOCD IN RCRD: CPT | Performed by: INTERNAL MEDICINE

## 2023-08-23 PROCEDURE — 3074F SYST BP LT 130 MM HG: CPT | Performed by: INTERNAL MEDICINE

## 2023-08-23 RX ORDER — CLOTRIMAZOLE AND BETAMETHASONE DIPROPIONATE 10; .64 MG/G; MG/G
CREAM TOPICAL
COMMUNITY

## 2023-08-23 RX ORDER — TAMSULOSIN HYDROCHLORIDE 0.4 MG/1
1 CAPSULE ORAL DAILY
COMMUNITY

## 2023-08-23 NOTE — PROGRESS NOTES
[facility]      Patient Name: Wm MESA Record  :1960  Age: 62 y.o.  Encounter Provider:  Tejas Worthy Jr, MD      Chief Complaint:   Chief Complaint   Patient presents with    Cardiomyopathy, unspecified type         HPI  Wm MESA Record is a 62 y.o. male past medical history of heart failure with recovered ejection fraction, pulmonary sarcoidosis, frequent PVCs who presents for initial evaluation with me.  Previously followed by Dr. Craven and I reviewed all pertinent electronic health records.  Patient was diagnosed with frequent PVCs and total ectopic burden of 16.6% in 2022.  Echo performed at that time showed EF of 40 to 45%.  Patient was placed on goal-directed medical therapy and last EF was 55%.  Patient was admitted to the hospital in May with near syncope.  Blood pressure was found to be extremely low.  Heart failure regimen was down titrated and he has been doing well since then.  No chest pain or shortness of air.  No orthopnea, PND or edema.  No palpitations, dizziness or syncope.  No cardiac complaints at time of interview.  Social family history was reviewed and is not pertinent to this clinic visit.      The following portions of the patient's history were reviewed and updated as appropriate: allergies, current medications, past family history, past medical history, past social history, past surgical history and problem list.      Review of Systems   Constitutional: Negative for chills and fever.   HENT:  Negative for hoarse voice and sore throat.    Eyes:  Negative for double vision and photophobia.   Cardiovascular:  Negative for chest pain, leg swelling, near-syncope, orthopnea, palpitations, paroxysmal nocturnal dyspnea and syncope.   Respiratory:  Negative for cough and wheezing.    Skin:  Negative for poor wound healing and rash.   Musculoskeletal:  Negative for arthritis and joint swelling.   Gastrointestinal:  Negative for bloating, abdominal  "pain, hematemesis and hematochezia.   Neurological:  Negative for dizziness and focal weakness.   Psychiatric/Behavioral:  Negative for depression and suicidal ideas.      OBJECTIVE:   Vital Signs  Vitals:    08/23/23 0936   BP: 122/80   Pulse: 57   SpO2: 94%     Estimated body mass index is 21.83 kg/mý as calculated from the following:    Height as of this encounter: 175.3 cm (69\").    Weight as of this encounter: 67 kg (147 lb 12.8 oz).    Vitals reviewed.   Constitutional:       Appearance: Healthy appearance. Not in distress.   Neck:      Vascular: No JVR. JVD normal.   Pulmonary:      Effort: Pulmonary effort is normal.      Breath sounds: Normal breath sounds. No wheezing. No rhonchi. No rales.   Chest:      Chest wall: Not tender to palpatation.   Cardiovascular:      PMI at left midclavicular line. Normal rate. Regular rhythm. Normal S1. Normal S2.       Murmurs: There is no murmur.      No gallop.  No click. No rub.   Pulses:     Intact distal pulses.   Edema:     Peripheral edema absent.   Abdominal:      General: Bowel sounds are normal.      Palpations: Abdomen is soft.      Tenderness: There is no abdominal tenderness.   Musculoskeletal: Normal range of motion.         General: No tenderness. Skin:     General: Skin is warm and dry.   Neurological:      General: No focal deficit present.      Mental Status: Alert and oriented to person, place and time.       Procedures    Lipid Panel          3/10/2023    09:47 5/12/2023    08:56 6/26/2023    10:11   Lipid Panel   Total Cholesterol 179  167  190    Triglycerides 73  89  63    HDL Cholesterol 46  45  41    VLDL Cholesterol 14  17  12    LDL Cholesterol  119  105  137         BUN   Date Value Ref Range Status   06/26/2023 22 8 - 23 mg/dL Final   05/27/2023 26 (H) 8 - 23 mg/dL Final   06/01/2020 15 7 - 20 mg/dL Final     Creatinine   Date Value Ref Range Status   06/26/2023 1.42 (H) 0.76 - 1.27 mg/dL Final   05/27/2023 1.46 (H) 0.76 - 1.27 mg/dL Final "   06/01/2020 1.2 0.7 - 1.5 mg/dL Final     Potassium   Date Value Ref Range Status   06/26/2023 5.1 3.5 - 5.2 mmol/L Final   05/27/2023 4.6 3.5 - 5.2 mmol/L Final   06/01/2020 4.5 3.5 - 5.1 mmol/L Final     ALT (SGPT)   Date Value Ref Range Status   06/26/2023 20 1 - 41 U/L Final   05/26/2023 14 1 - 41 U/L Final     AST (SGOT)   Date Value Ref Range Status   06/26/2023 18 1 - 40 U/L Final   05/26/2023 15 1 - 40 U/L Final           ASSESSMENT:     62-year-old male presents for follow-up visit of heart failure with recovered ejection fraction      PLAN OF CARE:     Heart failure with recovered ejection fraction -etiology uncertain.  Presumed secondary to hypertensive heart disease.  We had a long discussion about why he is still on medications and he agrees that this lower dose regimen is warranted in the setting of a patient who was previously diagnosed with heart failure and has now had recovery of the ejection fraction.  Continue same.  Euvolemic today in clinic.  Frequent PVCs -asymptomatic.  Normal EF on echo in May.  Continue same.  Hypertension -seemingly well controlled on lower doses after episode of hypotension.  Pulmonary sarcoidosis    Return to clinic 12 months             Discharge Medications            Accurate as of August 23, 2023  9:39 AM. If you have any questions, ask your nurse or doctor.                Changes to Medications        Instructions Start Date   spironolactone 25 MG tablet  Commonly known as: ALDACTONE  What changed: when to take this   25 mg, Oral, Daily             Continue These Medications        Instructions Start Date   atorvastatin 40 MG tablet  Commonly known as: Lipitor   40 mg, Oral, Daily      clotrimazole-betamethasone 1-0.05 % cream  Commonly known as: LOTRISONE   JAN EXT TO THE AFFECTED AND SURROUNDING AREAS BID IN THE MORNING AND IN THE LUCERO FOR 2 WKS      hydrocortisone 2.5 % cream   APPLY SPARINGLY TO AFFECTED AREA TWICE A DAY      losartan 25 MG tablet  Commonly  known as: Cozaar   25 mg, Oral, Daily      metoprolol succinate XL 25 MG 24 hr tablet  Commonly known as: Toprol XL   25 mg, Oral, Daily      tamsulosin 0.4 MG capsule 24 hr capsule  Commonly known as: FLOMAX   1 capsule, Oral, Daily      TYLENOL EXTRA STRENGTH PO   Oral      VITAMIN D-3 PO   Oral               Thank you for allowing me to participate in the care of your patient,      Sincerely,   Tejas Worthy MD  [facility]  08/23/23  09:39 EDT

## 2023-10-13 ENCOUNTER — TELEPHONE (OUTPATIENT)
Dept: INTERNAL MEDICINE | Facility: CLINIC | Age: 63
End: 2023-10-13

## 2023-10-13 ENCOUNTER — OFFICE VISIT (OUTPATIENT)
Dept: INTERNAL MEDICINE | Facility: CLINIC | Age: 63
End: 2023-10-13
Payer: MEDICARE

## 2023-10-13 VITALS
BODY MASS INDEX: 21.8 KG/M2 | HEIGHT: 69 IN | WEIGHT: 147.2 LBS | DIASTOLIC BLOOD PRESSURE: 78 MMHG | SYSTOLIC BLOOD PRESSURE: 112 MMHG | TEMPERATURE: 97.3 F

## 2023-10-13 DIAGNOSIS — I10 BENIGN ESSENTIAL HYPERTENSION: Chronic | ICD-10-CM

## 2023-10-13 DIAGNOSIS — R25.1 TREMOR OF RIGHT HAND: ICD-10-CM

## 2023-10-13 DIAGNOSIS — R93.1 DECREASED CARDIAC EJECTION FRACTION: Chronic | ICD-10-CM

## 2023-10-13 DIAGNOSIS — R25.1 TREMOR OF RIGHT HAND: Primary | ICD-10-CM

## 2023-10-13 DIAGNOSIS — E78.49 OTHER HYPERLIPIDEMIA: Chronic | ICD-10-CM

## 2023-10-13 DIAGNOSIS — Z23 NEED FOR INFLUENZA VACCINATION: ICD-10-CM

## 2023-10-13 DIAGNOSIS — Z00.00 ENCOUNTER FOR SUBSEQUENT ANNUAL WELLNESS VISIT (AWV) IN MEDICARE PATIENT: Primary | ICD-10-CM

## 2023-10-13 PROCEDURE — 1159F MED LIST DOCD IN RCRD: CPT | Performed by: INTERNAL MEDICINE

## 2023-10-13 PROCEDURE — 3078F DIAST BP <80 MM HG: CPT | Performed by: INTERNAL MEDICINE

## 2023-10-13 PROCEDURE — 1170F FXNL STATUS ASSESSED: CPT | Performed by: INTERNAL MEDICINE

## 2023-10-13 PROCEDURE — 1160F RVW MEDS BY RX/DR IN RCRD: CPT | Performed by: INTERNAL MEDICINE

## 2023-10-13 PROCEDURE — 3074F SYST BP LT 130 MM HG: CPT | Performed by: INTERNAL MEDICINE

## 2023-10-13 NOTE — TELEPHONE ENCOUNTER
Pt would like to have a referral for [facility] ( [facility] office )  for hand terrors  . Was seen in office today for this

## 2023-10-13 NOTE — PROGRESS NOTES
The ABCs of the Annual Wellness Visit  Subsequent Medicare Wellness Visit    Rosa Maria Mandel is a 63 y.o. male who presents for a Subsequent Medicare Wellness Visit.  He denies dizziness, presycnope, chest pain, dyspnea or edema. He had a cough last month after going to the park and being exposed to dust; it has improved. He has not been checking his BP. He has seen derm and is using a cream on his thighs.  The following portions of the patient's history were reviewed and   updated as appropriate: allergies, current medications, past family history, past medical history, past social history, past surgical history, and problem list.    Compared to one year ago, the patient feels his physical   health is the same.    Compared to one year ago, the patient feels his mental   health is the same.    Recent Hospitalizations:  This patient has had a [facility] admission record on file within the last 365 days.    Current Medical Providers:  Patient Care Team:  Dale Andrade MD as PCP - General (Internal Medicine)    Outpatient Medications Prior to Visit   Medication Sig Dispense Refill    Acetaminophen (TYLENOL EXTRA STRENGTH PO) Take  by mouth.      atorvastatin (Lipitor) 40 MG tablet Take 1 tablet by mouth Daily. 90 tablet 1    Cholecalciferol (VITAMIN D-3 PO) Take  by mouth.      hydrocortisone 2.5 % cream APPLY SPARINGLY TO AFFECTED AREA TWICE A DAY      losartan (Cozaar) 25 MG tablet Take 1 tablet by mouth Daily. 90 tablet 1    metoprolol succinate XL (Toprol XL) 25 MG 24 hr tablet Take 1 tablet by mouth Daily. 90 tablet 1    spironolactone (ALDACTONE) 25 MG tablet Take 1 tablet by mouth Daily. (Patient taking differently: Take 1 tablet by mouth Every Morning.) 90 tablet 3    tamsulosin (FLOMAX) 0.4 MG capsule 24 hr capsule Take 1 capsule by mouth Daily.      clotrimazole-betamethasone (LOTRISONE) 1-0.05 % cream JAN EXT TO THE AFFECTED AND SURROUNDING AREAS BID IN THE MORNING AND IN THE LUCERO  "FOR 2 WKS       No facility-administered medications prior to visit.       No opioid medication identified on active medication list. I have reviewed chart for other potential  high risk medication/s and harmful drug interactions in the elderly.        Aspirin is not on active medication list.  Aspirin use is not indicated based on review of current medical condition/s. Risk of harm outweighs potential benefits.  .    Patient Active Problem List   Diagnosis    Pulmonary sarcoidosis    Onychomycosis    Benign essential hypertension    Benign prostatic hyperplasia    Decreased cardiac ejection fraction    Vitamin D deficiency    Stage 3a chronic kidney disease (CKD)    Other hyperlipidemia     Advance Care Planning   Advance Care Planning     Advance Directive is on file.  ACP discussion was held with the patient during this visit. Patient has an advance directive in EMR which is still valid.      Objective    Vitals:    10/13/23 0835   BP: 112/78   Temp: 97.3 øF (36.3 øC)   Weight: 66.8 kg (147 lb 3.2 oz)   Height: 175.3 cm (69.02\")     Estimated body mass index is 21.73 kg/mý as calculated from the following:    Height as of this encounter: 175.3 cm (69.02\").    Weight as of this encounter: 66.8 kg (147 lb 3.2 oz).    BMI is within normal parameters. No other follow-up for BMI required.      Does the patient have evidence of cognitive impairment? No    Lab Results   Component Value Date    CHLPL 95 10/06/2023    TRIG 48 10/06/2023    HDL 32 (L) 10/06/2023    LDL 51 10/06/2023    VLDL 12 10/06/2023        HEALTH RISK ASSESSMENT    Smoking Status:  Social History     Tobacco Use   Smoking Status Never   Smokeless Tobacco Never   Tobacco Comments    caffeine use- denies     Alcohol Consumption:  Social History     Substance and Sexual Activity   Alcohol Use Never     Fall Risk Screen:    STEADI Fall Risk Assessment was completed, and patient is at LOW risk for falls.Assessment completed on:10/13/2023    Depression " Screening:      10/13/2023     8:37 AM   PHQ-2/PHQ-9 Depression Screening   Little Interest or Pleasure in Doing Things 0-->not at all   Feeling Down, Depressed or Hopeless 0-->not at all   PHQ-9: Brief Depression Severity Measure Score 0       Health Habits and Functional and Cognitive Screening:      10/13/2023     8:37 AM   Functional & Cognitive Status   Do you have difficulty preparing food and eating? No   Do you have difficulty bathing yourself, getting dressed or grooming yourself? No   Do you have difficulty using the toilet? No   Do you have difficulty moving around from place to place? No   Do you have trouble with steps or getting out of a bed or a chair? No   Current Diet Well Balanced Diet   Dental Exam Not up to date   Eye Exam Not up to date   Exercise (times per week) 0 times per week   Current Exercises Include No Regular Exercise   Do you need help using the phone?  No   Are you deaf or do you have serious difficulty hearing?  No   Do you need help to go to places out of walking distance? No   Do you need help shopping? No   Do you need help preparing meals?  No   Do you need help with housework?  No   Do you need help with laundry? No   Do you need help taking your medications? No   Do you need help managing money? No   Do you ever drive or ride in a car without wearing a seat belt? No   Have you felt unusual stress, anger or loneliness in the last month? No   Who do you live with? Community   If you need help, do you have trouble finding someone available to you? No   Have you been bothered in the last four weeks by sexual problems? No   Do you have difficulty concentrating, remembering or making decisions? No       Age-appropriate Screening Schedule:  Refer to the list below for future screening recommendations based on patient's age, sex and/or medical conditions. Orders for these recommended tests are listed in the plan section. The patient has been provided with a written plan.    Health  "Maintenance   Topic Date Due    INFLUENZA VACCINE  08/01/2023    COVID-19 Vaccine (4 - 2023-24 season) 09/01/2023    LIPID PANEL  10/06/2024    ANNUAL WELLNESS VISIT  10/13/2024    TDAP/TD VACCINES (2 - Td or Tdap) 09/11/2030    COLORECTAL CANCER SCREENING  10/13/2030    HEPATITIS C SCREENING  Completed    Pneumococcal Vaccine 0-64  Aged Out                  CMS Preventative Services Quick Reference  Risk Factors Identified During Encounter  Immunizations Discussed/Encouraged: Influenza  The above risks/problems have been discussed with the patient.  Pertinent information has been shared with the patient in the After Visit Summary.  An After Visit Summary and PPPS were made available to the patient.    Follow Up:   Next Medicare Wellness visit to be scheduled in 1 year.       Additional E&M Note during same encounter follows:  Patient has multiple medical problems which are significant and separately identifiable that require additional work above and beyond the Medicare Wellness Visit.      Chief Complaint  Medicare Wellness-subsequent    Subjective        HPI  Wm Mandel is also being seen today for Medicare Wellness and HTN.  He denies dizziness, presycnope, chest pain, dyspnea or edema. He had a cough last month after going to the park and being exposed to dust; it has improved. He has not been checking his BP. He has seen derm and is using a cream on his thighs. He has a tremor in his right hand. It started 30 years ago. He was seeing a neuro at  but he would like to change. It is worse with eating.        Objective   Vital Signs:  /78   Temp 97.3 øF (36.3 øC)   Ht 175.3 cm (69.02\")   Wt 66.8 kg (147 lb 3.2 oz)   BMI 21.73 kg/mý     Physical Exam  Vitals reviewed.   Constitutional:       Appearance: He is well-developed.   HENT:      Head: Normocephalic and atraumatic.   Neck:      Thyroid: No thyromegaly.      Vascular: No carotid bruit.   Cardiovascular:      Rate and Rhythm: Normal rate and " regular rhythm.      Heart sounds: Normal heart sounds, S1 normal and S2 normal.   Pulmonary:      Effort: Pulmonary effort is normal.      Breath sounds: Normal breath sounds.   Abdominal:      General: There is no distension.      Palpations: There is no hepatomegaly or splenomegaly.      Comments: Suprapubic catheter present   Lymphadenopathy:      Cervical: No cervical adenopathy.   Skin:     General: Skin is warm.   Neurological:      Mental Status: He is alert.      Comments: No tremor with outstretched hands    FTN with tremor (R>L)    JAG intact   Psychiatric:         Behavior: Behavior normal.                         Assessment and Plan   Diagnoses and all orders for this visit:    1. Encounter for subsequent annual wellness visit (AWV) in Medicare patient (Primary)    2. Benign essential hypertension  Comments:  BP is good  Orders:  -     Basic Metabolic Panel; Future    3. Decreased cardiac ejection fraction  Comments:  stable and sees cards    4. Other hyperlipidemia  Comments:  LDL is excellent    5. Tremor of right hand  Comments:  seen by  (Dr. Lafavre) in the past with an MRI. He will call to be seen there by someone else as he wants to change             Follow Up   Return in about 6 months (around 4/13/2024), or 30 minutes, for Lab Before FUP.  Patient was given instructions and counseling regarding his condition or for health maintenance advice. Please see specific information pulled into the AVS if appropriate.       Reviewed cmp and flp. LDL is excellent. Flu shot today. He will talk with Dr. Sweet about getting a PSA

## 2023-10-31 ENCOUNTER — HOSPITAL ENCOUNTER (EMERGENCY)
Facility: HOSPITAL | Age: 63
Discharge: HOME | End: 2023-10-31
Attending: EMERGENCY MEDICINE | Admitting: EMERGENCY MEDICINE
Payer: MEDICARE

## 2023-10-31 VITALS
TEMPERATURE: 97.4 F | RESPIRATION RATE: 14 BRPM | HEIGHT: 69 IN | SYSTOLIC BLOOD PRESSURE: 152 MMHG | BODY MASS INDEX: 21.18 KG/M2 | HEART RATE: 70 BPM | WEIGHT: 143 LBS | DIASTOLIC BLOOD PRESSURE: 97 MMHG | OXYGEN SATURATION: 93 %

## 2023-10-31 DIAGNOSIS — L25.9 CONTACT DERMATITIS, UNSPECIFIED CONTACT DERMATITIS TYPE, UNSPECIFIED TRIGGER: Primary | ICD-10-CM

## 2023-10-31 PROCEDURE — 99283 EMERGENCY DEPT VISIT LOW MDM: CPT

## 2023-10-31 PROCEDURE — 96375 TX/PRO/DX INJ NEW DRUG ADDON: CPT

## 2023-10-31 PROCEDURE — 96374 THER/PROPH/DIAG INJ IV PUSH: CPT

## 2023-10-31 PROCEDURE — 25010000002 METHYLPREDNISOLONE PER 125 MG: Performed by: EMERGENCY MEDICINE

## 2023-10-31 PROCEDURE — 25010000002 DIPHENHYDRAMINE PER 50 MG: Performed by: EMERGENCY MEDICINE

## 2023-10-31 RX ORDER — METHYLPREDNISOLONE SODIUM SUCCINATE 125 MG/2ML
125 INJECTION, POWDER, LYOPHILIZED, FOR SOLUTION INTRAMUSCULAR; INTRAVENOUS ONCE
Status: COMPLETED | OUTPATIENT
Start: 2023-10-31 | End: 2023-10-31

## 2023-10-31 RX ORDER — FAMOTIDINE 10 MG/ML
20 INJECTION, SOLUTION INTRAVENOUS ONCE
Status: COMPLETED | OUTPATIENT
Start: 2023-10-31 | End: 2023-10-31

## 2023-10-31 RX ORDER — DIPHENHYDRAMINE HYDROCHLORIDE 50 MG/ML
25 INJECTION INTRAMUSCULAR; INTRAVENOUS ONCE
Status: COMPLETED | OUTPATIENT
Start: 2023-10-31 | End: 2023-10-31

## 2023-10-31 RX ADMIN — FAMOTIDINE 20 MG: 10 INJECTION INTRAVENOUS at 21:02

## 2023-10-31 RX ADMIN — DIPHENHYDRAMINE HYDROCHLORIDE 25 MG: 50 INJECTION, SOLUTION INTRAMUSCULAR; INTRAVENOUS at 21:01

## 2023-10-31 RX ADMIN — METHYLPREDNISOLONE SODIUM SUCCINATE 125 MG: 125 INJECTION, POWDER, FOR SOLUTION INTRAMUSCULAR; INTRAVENOUS at 21:02

## 2023-11-01 ENCOUNTER — HOSPITAL ENCOUNTER (EMERGENCY)
Facility: HOSPITAL | Age: 63
Discharge: HOME | End: 2023-11-01
Attending: EMERGENCY MEDICINE | Admitting: EMERGENCY MEDICINE
Payer: MEDICARE

## 2023-11-01 ENCOUNTER — TELEPHONE (OUTPATIENT)
Dept: INTERNAL MEDICINE | Facility: CLINIC | Age: 63
End: 2023-11-01
Payer: MEDICARE

## 2023-11-01 VITALS
OXYGEN SATURATION: 96 % | HEIGHT: 69 IN | WEIGHT: 143 LBS | TEMPERATURE: 97.6 F | DIASTOLIC BLOOD PRESSURE: 91 MMHG | RESPIRATION RATE: 16 BRPM | HEART RATE: 86 BPM | BODY MASS INDEX: 21.18 KG/M2 | SYSTOLIC BLOOD PRESSURE: 151 MMHG

## 2023-11-01 DIAGNOSIS — L50.9 URTICARIA: Primary | ICD-10-CM

## 2023-11-01 PROCEDURE — 99282 EMERGENCY DEPT VISIT SF MDM: CPT

## 2023-11-01 RX ORDER — FAMOTIDINE 20 MG/1
20 TABLET, FILM COATED ORAL 2 TIMES DAILY
Qty: 14 TABLET | Refills: 0 | Status: SHIPPED | OUTPATIENT
Start: 2023-11-01 | End: 2023-11-08

## 2023-11-01 RX ORDER — CETIRIZINE HYDROCHLORIDE 10 MG/1
10 TABLET ORAL DAILY
Qty: 7 TABLET | Refills: 0 | Status: SHIPPED | OUTPATIENT
Start: 2023-11-01 | End: 2023-11-08

## 2023-11-01 NOTE — DISCHARGE INSTRUCTIONS
Benadryl 25 mg every 6 hours as needed for rash or itching, follow-up with PCP/dermatology this week for recheck as needed, ED return for worsening symptoms as needed.

## 2023-11-01 NOTE — ED PROVIDER NOTES
EMERGENCY DEPARTMENT ENCOUNTER    Room Number:  05/05  PCP: Dale Andrade MD  Discussed/ obtained information from independent historians: patient      HPI:  Chief Complaint: rash  A complete HPI/ROS/PMH/PSH/SH/FH are unobtainable due to: none  Context: Wm Mandel is a 63 y.o. male who presents to the ED c/o an itching rash started yesterday.  He came to the ER yesterday and was treated, felt better.  Woke up this morning with an exacerbation presents for reevaluation.  He did not take any medications at home for it.  He denies any difficulty swallowing, fevers, vomiting or other complaints at this time.  He has a dermatologist.      External (non-ED) record review: Office visit with Dr. Andrade 10/13/2023 for routine follow-up.  Was given flu vaccination, blood pressure was good, outpatient labs ordered.      PAST MEDICAL HISTORY  Active Ambulatory Problems     Diagnosis Date Noted    Pulmonary sarcoidosis 08/14/2020    Onychomycosis 08/14/2020    Benign essential hypertension 11/18/2020    Benign prostatic hyperplasia 08/14/2020    Decreased cardiac ejection fraction 03/01/2022    Vitamin D deficiency 03/01/2022    Stage 3a chronic kidney disease (CKD) 09/15/2022    Other hyperlipidemia 03/16/2023     Resolved Ambulatory Problems     Diagnosis Date Noted    Near syncope 05/27/2023     Past Medical History:   Diagnosis Date    Leukocytoclastic vasculitis     Tremor          PAST SURGICAL HISTORY  Past Surgical History:   Procedure Laterality Date    COLONOSCOPY  10/13/2020    INTERSTIM PLACEMENT  2023    LUNG SURGERY      PROSTATE SURGERY      SUPRAPUBIC CATHETER INSERTION           FAMILY HISTORY  Family History   Problem Relation Age of Onset    Cancer Mother     Other Father         gunshot         SOCIAL HISTORY  Social History     Socioeconomic History    Marital status: Single   Tobacco Use    Smoking status: Never    Smokeless tobacco: Never    Tobacco comments:     caffeine use- denies   Vaping  Use    Vaping Use: Never used   Substance and Sexual Activity    Alcohol use: Never    Drug use: Never    Sexual activity: Yes     Birth control/protection: Condom         ALLERGIES  Shingrix [zoster vac recomb adjuvanted]        REVIEW OF SYSTEMS  Review of Systems         PHYSICAL EXAM  ED Triage Vitals   Temp Heart Rate Resp BP SpO2   11/01/23 0738 11/01/23 0738 11/01/23 0738 11/01/23 0830 11/01/23 0738   97.6 °F (36.4 °C) 99 16 151/91 100 %      Temp src Heart Rate Source Patient Position BP Location FiO2 (%)   -- -- -- -- --              Physical Exam      GENERAL: no acute distress well-appearing, conversational  HENT: normocephalic, atraumatic  EYES: no scleral icterus  CV: regular rhythm, normal rate  RESPIRATORY: normal effort CTA B  ABDOMEN: nondistended soft nontender  MUSCULOSKELETAL: no deformity  NEURO: alert, moves all extremities, follows commands  PSYCH:  calm, cooperative  SKIN: warm, dry, raised blanching wheals scattered over the extremities and trunk    Vital signs and nursing notes reviewed.                PROCEDURES  Procedures              MEDICATIONS GIVEN IN ER  Medications - No data to display                MEDICAL DECISION MAKING, PROGRESS, and CONSULTS    All labs have been independently reviewed by me.  All radiology studies have been reviewed by me and I have also reviewed the radiology report.   EKG's independently viewed and interpreted by me.  Discussion below represents my analysis of pertinent findings related to patient's condition, differential diagnosis, treatment plan and final disposition.            Orders placed during this visit:  No orders of the defined types were placed in this encounter.          Differential diagnosis:  Spontaneous urticaria, allergic reaction, contact dermatitis, anaphylaxis, infection      Independent interpretation of labs, radiology studies, and discussions with consultants:       Patient has no fever or infectious symptoms.  No difficulty  swallowing or breathing.  Has urticaria without evidence of anaphylaxis.  Has not take any medication at home for his symptoms.  Was given Solu-Medrol Pepcid and Benadryl in ER intravenously yesterday.  I recommended daily Zyrtec and twice daily Pepcid, prescribed these for him today.  Additionally I recommended close follow-up with his PCP, follow-up with an allergist and follow-up with his dermatologist for further treatment and to discern the source of his hives.  He is agreeable.  Counseled on return precautions.      - Shared decision making: Recommended discharge, Zyrtec and Pepcid, patient agreeable    Additional orders considered but not ordered:  Considered repeat treatment with medications in the ER, not indicated            DIAGNOSIS  Final diagnoses:   Urticaria           Follow Up:  Dale Andrade MD  [address] [id number] [phone]    Schedule an appointment as soon as possible for a visit in 2 days      [facility] EMERGENCY DEPARTMENT  [address] [facility] [address] [phone]    If symptoms worsen or any concerns    Your dermatologist    Schedule an appointment as soon as possible for a visit         RX:     Medication List        New Prescriptions      cetirizine 10 MG tablet  Commonly known as: zyrTEC  Take 1 tablet by mouth Daily for 7 days.     famotidine 20 MG tablet  Commonly known as: PEPCID  Take 1 tablet by mouth 2 (Two) Times a Day for 7 days.            Changed      spironolactone 25 MG tablet  Commonly known as: ALDACTONE  Take 1 tablet by mouth Daily.  What changed: when to take this               Where to Get Your Medications        These medications were sent to [facility]  [address] [facility] [address]      Hours: Monday to Friday 7 AM to 6 PM, Saturday & Sunday 8 AM to 4:30 PM (Closed 12 PM to 12:30 PM) Phone: [phone]   cetirizine 10 MG tablet  famotidine 20 MG tablet          Latest Documented Vital Signs:  As of 08:45 EDT  BP- 151/91 HR- 91 Temp- 97.6 °F (36.4 °C) O2 sat- 96%              --    Please note that portions of this were completed with a voice recognition program.       Note Disclaimer: At [facility], we believe that sharing information builds trust and better relationships. You are receiving this note because you are receiving care at [facility] or recently visited. It is possible you will see health information before a provider has talked with you about it. This kind of information can be easy to misunderstand. To help you fully understand what it means for your health, we urge you to discuss this note with your provider.             Nikki Espana PA-C  11/01/23 0876

## 2023-11-01 NOTE — ED NOTES
Patient to ER via car from home for rash  Patient was here for same yesterday patient states he doesn't have anyone to rub the medication on himself

## 2023-11-01 NOTE — ED PROVIDER NOTES
EMERGENCY DEPARTMENT ENCOUNTER    Room Number:  12/12  PCP: Dale Andrade MD  Historian: Patient      HPI:  Chief Complaint: Rash  A complete HPI/ROS/PMH/PSH/SH/FH are unobtainable due to: None    Context: Wm Mandel is a 63 y.o. male who presents to the ED via private vehicle for evaluation of cute onset of rash in his armpits and groin bilaterally.  Started around 3 PM today.  Is itchy.  Denies using any new detergents or soaps, does have a new soap as compared to August but has been using that without issue since then.  No swelling of lips, tongue, throat, no difficulty breathing or swallowing.  Did not take anything for symptoms prior to arrival.      MEDICAL RECORD REVIEW    External (non-ED) record review: No anticoagulants noted on chart review in epic    PAST MEDICAL HISTORY  Active Ambulatory Problems     Diagnosis Date Noted    Pulmonary sarcoidosis 08/14/2020    Onychomycosis 08/14/2020    Benign essential hypertension 11/18/2020    Benign prostatic hyperplasia 08/14/2020    Decreased cardiac ejection fraction 03/01/2022    Vitamin D deficiency 03/01/2022    Stage 3a chronic kidney disease (CKD) 09/15/2022    Other hyperlipidemia 03/16/2023     Resolved Ambulatory Problems     Diagnosis Date Noted    Near syncope 05/27/2023     Past Medical History:   Diagnosis Date    Leukocytoclastic vasculitis     Tremor          PAST SURGICAL HISTORY  Past Surgical History:   Procedure Laterality Date    COLONOSCOPY  10/13/2020    INTERSTIM PLACEMENT  2023    LUNG SURGERY      PROSTATE SURGERY      SUPRAPUBIC CATHETER INSERTION           FAMILY HISTORY  Family History   Problem Relation Age of Onset    Cancer Mother     Other Father         gunshot         SOCIAL HISTORY  Social History     Socioeconomic History    Marital status: Single   Tobacco Use    Smoking status: Never    Smokeless tobacco: Never    Tobacco comments:     caffeine use- denies   Vaping Use    Vaping Use: Never used   Substance and  Sexual Activity    Alcohol use: Never    Drug use: Never    Sexual activity: Yes     Birth control/protection: Condom         ALLERGIES  Shingrix [zoster vac recomb adjuvanted]        REVIEW OF SYSTEMS  Review of Systems     All systems reviewed and negative except for those discussed in HPI.       PHYSICAL EXAM    I have reviewed the triage vital signs and nursing notes.    ED Triage Vitals   Temp Heart Rate Resp BP SpO2   10/31/23 2012 10/31/23 2012 10/31/23 2012 10/31/23 2021 10/31/23 2012   97.4 °F (36.3 °C) 70 14 152/97 93 %      Temp src Heart Rate Source Patient Position BP Location FiO2 (%)   10/31/23 2012 10/31/23 2012 10/31/23 2021 10/31/23 2021 --   Tympanic Monitor Lying Right arm        Physical Exam  General: No acute distress, nontoxic  HEENT: Mucous membranes moist, no angioedema of the lips, tongue, oropharynx, atraumatic, EOMI  Neck: Full ROM  Pulm: Symmetric chest rise, nonlabored, lungs CTAB without wheezing or stridor  Cardiovascular: Regular rate and rhythm, intact distal pulses  GI: Soft, nontender, nondistended, no rebound, no guarding, bowel sounds present  : Mendiola catheter in place  MSK: Full ROM, no deformity  Skin: Warm, dry, urticarial rash erythematous in nature and confluent in the axilla and groin bilaterally  Neuro: Awake, alert, oriented x 4, GCS 15, moving all extremities, no focal deficits  Psych: Calm, cooperative        LAB RESULTS  No results found for this or any previous visit (from the past 24 hour(s)).    Ordered the above labs and independently interpreted results. My findings will be discussed in the medical decision making section below        RADIOLOGY  No Radiology Exams Resulted Within Past 24 Hours    Ordered the above noted radiological studies.  Independently interpreted by me and my independent review of findings can be found in the ED Course.  See dictation for official radiology interpretation.      PROCEDURES    Procedures      MEDICATIONS GIVEN IN  ER    Medications   diphenhydrAMINE (BENADRYL) injection 25 mg (25 mg Intravenous Given 10/31/23 2101)   famotidine (PEPCID) injection 20 mg (20 mg Intravenous Given 10/31/23 2102)   methylPREDNISolone sodium succinate (SOLU-Medrol) injection 125 mg (125 mg Intravenous Given 10/31/23 2102)         PROGRESS, DATA ANALYSIS, CONSULTS, AND MEDICAL DECISION MAKING    Please note that this section constitutes my independent interpretation of clinical data including lab results, radiology, EKG's.  This constitutes my independent professional opinion regarding differential diagnosis and management of this patient.  It may include any factors such as history from outside sources, review of external records, social determinants of health, management of medications, response to those treatments, and discussions with other providers.    Differential Diagnosis and Plan: Initial concern for topical dermatitis, allergic reaction, among others.  Plan for symptomatic control with Benadryl, Solu-Medrol, Pepcid, and further monitoring.  No need for emergent laboratory or imaging work-up at this time.    Additional sources:  - Discussed/ obtained information from independent historians:       - Chronic or social conditions impacting care:      - Shared decision making:  Patient fully updated on and in agreement with the course and plan moving forward    ED Course as of 10/31/23 2213   Tue Oct 31, 2023   2208 On reevaluation the patient's areas of concern are significant improved, only some faint mild erythema remaining.  Suspect more of a contact dermatitis reaction at this time given the area in the groin and axilla bilaterally.  Plan for outpatient Benadryl for the next couple days, PCP/dermatology follow-up, and ED return for worsening symptoms as needed.  All questions and concerns addressed.  Has not developed any angioedema at all during his ER stay. [DC]      ED Course User Index  [DC] Santos Dumont MD       Hospitalization  Considered?:     Orders Placed During This Visit:  No orders of the defined types were placed in this encounter.      Additional orders considered but not placed:      Independent interpretation of labs, radiology studies, and discussions with consultants: See ED Course        AS OF 22:13 EDT VITALS:    BP - 152/97  HR - 70  TEMP - 97.4 °F (36.3 °C) (Tympanic)  02 SATS - 93%        DIAGNOSIS  Final diagnoses:   Contact dermatitis, unspecified contact dermatitis type, unspecified trigger         DISPOSITION  DISCHARGE    Patient discharged in stable condition.    Reviewed implications of results, diagnosis, meds, responsibility to follow up, warning signs and symptoms of possible worsening, potential complications and reasons to return to ER. If your blood pressure > 120/80 please follow up with your primary care provider for further management.    Patient/Family voiced understanding of above instructions.    Discussed plan for discharge, as there is no emergent indication for admission. Pt/family is agreeable and understands need for follow up and repeat testing.  Pt is aware that discharge does not mean that nothing is wrong but it indicates no emergency is present that requires admission and they must continue care with follow-up as given below or physician of their choice.     FOLLOW-UP  [facility] EMERGENCY DEPARTMENT  [address] [facility] [address] [phone]    As needed, If symptoms worsen    Dale Andrade MD  [address] [address] [phone]    Schedule an appointment as soon as possible for a visit   As needed         Medication List        Changed      spironolactone 25 MG tablet  Commonly known as: ALDACTONE  Take 1 tablet by mouth Daily.  What changed: when to take this                            --    Please note that portions of this were completed with a voice recognition program.       Note Disclaimer: [facility], we  believe that sharing information builds trust and better relationships. You are receiving this note because you are receiving care at [facility] or recently visited. It is possible you will see health information before a provider has talked with you about it. This kind of information can be easy to misunderstand. To help you fully understand what it means for your health, we urge you to discuss this note with your provider.           Santos Dumont MD  10/31/23 3146     [FreeTextEntry1] : 64 y/o F former smoker with PMHx HTN, HLD , CAD, s/p PCI and CABG, s/p Bioprosthetic AVR 2002, Mitral Valve, s/p Mitral valve annuloplasty 2002, PAD s/p pLSFA stent LCIA stent, with occluded Bilateral SFA (proximal to mid portion per U/S 2019), Chronic Anemia, AAA s/p Open repair 4/2/2015, CKD , Severe Renal Artery Stenosis who presented to [facility] ED 8/20/2020 She was noted to have saccular AAA and contained rupture adjacent to previous stent graft. On 8/25 she underwent open thoracoabdominal aneurysm repair, with 20mm tube graft and 6mm bypasses to the celiac, SMA, left renal, right renal (end to end).  LLE < 2 block claudication s/p LLE angiogram angioplasty and stent to the left internal iliac artery on 5/6/202. \par \par Presents for follow up on LLE angiogram stent and claudication.Patient complains of increase sensitivity to the R groin. She states symptoms are triggered with any type of pressure or with cloth rubbing against her skin. She continues having trouble walking due to the pain. She develops discomfort particularly to the calves and feels the need to stop after walking 2 blocks.Once the pain subsides she resumes her walking. Otherwise patient denies no open sores/ulcers. She denies fever,

## 2023-11-01 NOTE — TELEPHONE ENCOUNTER
PT went to ER for rash all over was told to follow up with his pcp.  Where can we put in for ER follow up?  PT aware your off today can let him know tomorrow

## 2023-11-02 ENCOUNTER — OFFICE VISIT (OUTPATIENT)
Dept: INTERNAL MEDICINE | Facility: CLINIC | Age: 63
End: 2023-11-02
Payer: MEDICARE

## 2023-11-02 VITALS — HEIGHT: 69 IN | BODY MASS INDEX: 23.16 KG/M2 | WEIGHT: 156.4 LBS

## 2023-11-02 DIAGNOSIS — R21 RASH: Primary | ICD-10-CM

## 2023-11-02 NOTE — PROGRESS NOTES
Subjective        Chief Complaint   Patient presents with    Rash           Wm Mandel is a 63 y.o. male who presents for    Patient Active Problem List   Diagnosis    Pulmonary sarcoidosis    Onychomycosis    Benign essential hypertension    Benign prostatic hyperplasia    Decreased cardiac ejection fraction    Vitamin D deficiency    Stage 3a chronic kidney disease (CKD)    Other hyperlipidemia       History of Present Illness     He developed a rash on Monday under his arms and on the side of his ribs and thighs after washing his clothes at a laundromat over the weekend.    He changed to Vanicream in August and a non allergic detergent in August. He has not had any recent changes. He was given a solumedrol shot in the ER. He was given zyrtec and pepcid. He has not had any shellfish or nuts.    Allergies   Allergen Reactions    Shingrix [Zoster Vac Recomb Adjuvanted] Rash       Current Outpatient Medications on File Prior to Visit   Medication Sig Dispense Refill    Acetaminophen (TYLENOL EXTRA STRENGTH PO) Take  by mouth.      atorvastatin (Lipitor) 40 MG tablet Take 1 tablet by mouth Daily. 90 tablet 1    cetirizine (zyrTEC) 10 MG tablet Take 1 tablet by mouth Daily for 7 days. 7 tablet 0    Cholecalciferol (VITAMIN D-3 PO) Take  by mouth.      famotidine (PEPCID) 20 MG tablet Take 1 tablet by mouth 2 (Two) Times a Day for 7 days. 14 tablet 0    hydrocortisone 2.5 % cream APPLY SPARINGLY TO AFFECTED AREA TWICE A DAY      losartan (Cozaar) 25 MG tablet Take 1 tablet by mouth Daily. 90 tablet 1    metoprolol succinate XL (Toprol XL) 25 MG 24 hr tablet Take 1 tablet by mouth Daily. 90 tablet 1    spironolactone (ALDACTONE) 25 MG tablet Take 1 tablet by mouth Daily. (Patient taking differently: Take 1 tablet by mouth Every Morning.) 90 tablet 3    tamsulosin (FLOMAX) 0.4 MG capsule 24 hr capsule Take 1 capsule by mouth Daily.       No current facility-administered medications on file prior to visit.       Past  "Medical History:   Diagnosis Date    Leukocytoclastic vasculitis     Near syncope 05/27/2023    Tremor     seen at [facility]       Past Surgical History:   Procedure Laterality Date    COLONOSCOPY  10/13/2020    INTERSTIM PLACEMENT  2023    LUNG SURGERY      PROSTATE SURGERY      SUPRAPUBIC CATHETER INSERTION         Family History   Problem Relation Age of Onset    Cancer Mother     Other Father         gunshot       Social History     Socioeconomic History    Marital status: Single   Tobacco Use    Smoking status: Never    Smokeless tobacco: Never    Tobacco comments:     caffeine use- denies   Vaping Use    Vaping Use: Never used   Substance and Sexual Activity    Alcohol use: Never    Drug use: Never    Sexual activity: Yes     Birth control/protection: Condom           The following portions of the patient's history were reviewed and updated as appropriate: problem list, allergies, current medications, past medical history, past family history, past social history, and past surgical history.    Review of Systems    Immunization History   Administered Date(s) Administered    COVID-19 (PFIZER) Purple Cap Monovalent 04/01/2021, 04/22/2021, 11/10/2021    Flu Vaccine Quad PF >36MO 09/11/2020    Flu Vaccine Split Quad 02/04/2020    Fluzone (or Fluarix & Flulaval for VFC) >6mos 09/15/2022, 10/13/2023    Influenza, Unspecified 10/22/2021    Tdap 09/11/2020       Objective   Vitals:    11/02/23 1417   Weight: 70.9 kg (156 lb 6.4 oz)   Height: 175.3 cm (69.02\")     Body mass index is 23.09 kg/m².  Physical Exam  Skin:     Comments: Faint pink papules on his thighs         Procedures    Assessment & Plan   Diagnoses and all orders for this visit:    1. Rash (Primary)  Comments:  Continue zyrtec and pepcid. Some better. to see derm on 11/16.                 Reviewed note from ER. Better. See above.  No follow-ups on file.  "

## 2023-12-04 DIAGNOSIS — I10 BENIGN ESSENTIAL HYPERTENSION: Chronic | ICD-10-CM

## 2023-12-04 RX ORDER — LOSARTAN POTASSIUM 25 MG/1
25 TABLET ORAL DAILY
Qty: 90 TABLET | Refills: 1 | Status: SHIPPED | OUTPATIENT
Start: 2023-12-04

## 2023-12-04 RX ORDER — METOPROLOL SUCCINATE 25 MG/1
25 TABLET, EXTENDED RELEASE ORAL DAILY
Qty: 90 TABLET | Refills: 1 | Status: SHIPPED | OUTPATIENT
Start: 2023-12-04

## 2023-12-06 DIAGNOSIS — R93.1 DECREASED CARDIAC EJECTION FRACTION: ICD-10-CM

## 2023-12-06 DIAGNOSIS — I10 BENIGN ESSENTIAL HYPERTENSION: Chronic | ICD-10-CM

## 2023-12-06 RX ORDER — SPIRONOLACTONE 25 MG/1
25 TABLET ORAL DAILY
Qty: 90 TABLET | Refills: 3 | Status: SHIPPED | OUTPATIENT
Start: 2023-12-06

## 2023-12-06 NOTE — TELEPHONE ENCOUNTER
[place]er: [facility] [place] - [place], [place] - [address] [facility] - [phone] [facility] [phone] FX    Relationship: Pharmacy    Best call back number: [phone] (Work)     Requested Prescriptions:   spironolactone (ALDACTONE) 25 MG tablet        Pharmacy where request should be sent: [facility] - [place], [place] - [address] [facility] - [phone] [facility] [phone] FX     Last office visit with prescribing clinician: 11/2/2023   Last telemedicine visit with prescribing clinician: Visit date not found   Next office visit with prescribing clinician: 4/15/2024     Additional details provided by patient:      Does the patient have less than a 3 day supply:  [] Yes  [] No    Would you like a call back once the refill request has been completed: [] Yes [] No    If the office needs to give you a call back, can they leave a voicemail: [] Yes [] No    Cole Stern Rep   12/06/23 15:43 EST         THANKS

## 2023-12-12 DIAGNOSIS — E78.49 OTHER HYPERLIPIDEMIA: Chronic | ICD-10-CM

## 2023-12-12 DIAGNOSIS — I10 BENIGN ESSENTIAL HYPERTENSION: Chronic | ICD-10-CM

## 2023-12-12 DIAGNOSIS — R93.1 DECREASED CARDIAC EJECTION FRACTION: ICD-10-CM

## 2023-12-12 RX ORDER — METOPROLOL SUCCINATE 25 MG/1
25 TABLET, EXTENDED RELEASE ORAL DAILY
Qty: 90 TABLET | Refills: 1 | Status: SHIPPED | OUTPATIENT
Start: 2023-12-12

## 2023-12-12 RX ORDER — SPIRONOLACTONE 25 MG/1
25 TABLET ORAL DAILY
Qty: 90 TABLET | Refills: 3 | Status: SHIPPED | OUTPATIENT
Start: 2023-12-12

## 2023-12-12 RX ORDER — ATORVASTATIN CALCIUM 40 MG/1
40 TABLET, FILM COATED ORAL DAILY
Qty: 90 TABLET | Refills: 1 | Status: SHIPPED | OUTPATIENT
Start: 2023-12-12

## 2023-12-12 RX ORDER — LOSARTAN POTASSIUM 25 MG/1
25 TABLET ORAL DAILY
Qty: 90 TABLET | Refills: 1 | Status: SHIPPED | OUTPATIENT
Start: 2023-12-12

## 2023-12-27 NOTE — TELEPHONE ENCOUNTER
Caller: [facility] - [place], [place] - [address] [facility] - [phone] [facility] [phone] FX    Relationship: Pharmacy    Best call back number:[phone] (Work)     Requested Prescriptions:     tamsulosin (FLOMAX) 0.4 MG capsule 24 hr capsule          Pharmacy where request should be sent:  [facility] - [place], [place] - [address] [facility] - [phone] [facility] [phone] FX     Last office visit with prescribing clinician: 11/2/2023   Last telemedicine visit with prescribing clinician: Visit date not found   Next office visit with prescribing clinician: 4/15/2024     Additional details provided by patient: PATIENT'S PHARMACY CALLED TO REQUEST A MEDICATION REFILL. PATIENT IS RUNNING LOW ON THIS PRESCRIPTION.    Does the patient have less than a 3 day supply:  [x] Yes  [] No    Would you like a call back once the refill request has been completed: [] Yes [] No    If the office needs to give you a call back, can they leave a voicemail: [] Yes [] No    Cole Stern Rep   12/27/23 14:17 EST         THANKS

## 2023-12-28 RX ORDER — TAMSULOSIN HYDROCHLORIDE 0.4 MG/1
1 CAPSULE ORAL DAILY
Qty: 30 CAPSULE | Refills: 1 | Status: SHIPPED | OUTPATIENT
Start: 2023-12-28

## 2024-01-19 DIAGNOSIS — E78.49 OTHER HYPERLIPIDEMIA: Chronic | ICD-10-CM

## 2024-01-19 RX ORDER — ATORVASTATIN CALCIUM 40 MG/1
40 TABLET, FILM COATED ORAL DAILY
Qty: 90 TABLET | Refills: 1 | Status: SHIPPED | OUTPATIENT
Start: 2024-01-19

## 2024-02-09 ENCOUNTER — TELEPHONE (OUTPATIENT)
Dept: INTERNAL MEDICINE | Facility: CLINIC | Age: 64
End: 2024-02-09
Payer: MEDICARE

## 2024-02-09 DIAGNOSIS — I10 BENIGN ESSENTIAL HYPERTENSION: Chronic | ICD-10-CM

## 2024-02-09 RX ORDER — METOPROLOL SUCCINATE 25 MG/1
25 TABLET, EXTENDED RELEASE ORAL DAILY
Qty: 90 TABLET | Refills: 0 | Status: SHIPPED | OUTPATIENT
Start: 2024-02-09

## 2024-02-09 RX ORDER — LOSARTAN POTASSIUM 25 MG/1
25 TABLET ORAL DAILY
Qty: 90 TABLET | Refills: 0 | Status: SHIPPED | OUTPATIENT
Start: 2024-02-09

## 2024-02-09 NOTE — TELEPHONE ENCOUNTER
"Caller: Wm Mandel \"SEDRICK\"    Relationship: Self    Best call back number: [phone]    Requested Prescriptions:   Requested Prescriptions     Pending Prescriptions Disp Refills    metoprolol succinate XL (TOPROL-XL) 25 MG 24 hr tablet 90 tablet 1     Sig: Take 1 tablet by mouth Daily.    losartan (COZAAR) 25 MG tablet 90 tablet 1     Sig: Take 1 tablet by mouth Daily.        Pharmacy where request should be sent: [facility]Y #[id number] - [facility] [address] [place] [address]. AT NEAR [facility] [phone] [facility] [phone] FX     Last office visit with prescribing clinician: 11/2/2023   Last telemedicine visit with prescribing clinician: Visit date not found   Next office visit with prescribing clinician: 4/15/2024     Additional details provided by patient: PATIENT STATES HE IS OUT OF BOTH OF THESE MEDICATIONS.     PATIENT STATES THE PHARMACY ADVISED HIM TO CONTACT DR. BARRIOS FOR A REFILL.     Does the patient have less than a 3 day supply:  [x] Yes  [] No    Would you like a call back once the refill request has been completed: [x] Yes [] No    If the office needs to give you a call back, can they leave a voicemail: [x] Yes [] No    Bruna Mayer, PCT   02/09/24 08:55 EST           "

## 2024-02-29 ENCOUNTER — TELEPHONE (OUTPATIENT)
Dept: INTERNAL MEDICINE | Facility: CLINIC | Age: 64
End: 2024-02-29
Payer: MEDICARE

## 2024-02-29 DIAGNOSIS — L84 CALLUS OF FOOT: Primary | ICD-10-CM

## 2024-02-29 DIAGNOSIS — L84 CALLUS: ICD-10-CM

## 2024-02-29 NOTE — TELEPHONE ENCOUNTER
"Caller: Tequila, Wm MESA \"SEDRICK\"    Relationship: Self    Best call back number: [phone]    What is the medical concern/diagnosis: CALLUS ON LEFT TOE     What is the provider, practice or medical service name: [facility]     What is the office location: [place]     What is the office phone number: [phone]    Any additional details: PATIENT STATES HE HAS NOT BEEN IN TO SEE [facility] SINCE HE STARTED WITH HIS NEW INSURANCE AND WAS TOLD BY THE FACILITY THAT HE WOULD NEED A NEW REFERRAL BEFORE HE IS ABLE TO SCHEDULE AN APPOINTMENT .     PATIENT IS REQUESTING A CALL BACK.     "

## 2024-04-23 ENCOUNTER — OFFICE VISIT (OUTPATIENT)
Dept: INTERNAL MEDICINE | Facility: CLINIC | Age: 64
End: 2024-04-23
Payer: MEDICARE

## 2024-04-23 VITALS
BODY MASS INDEX: 23.25 KG/M2 | DIASTOLIC BLOOD PRESSURE: 80 MMHG | SYSTOLIC BLOOD PRESSURE: 108 MMHG | WEIGHT: 157 LBS | HEIGHT: 69 IN

## 2024-04-23 DIAGNOSIS — E78.49 OTHER HYPERLIPIDEMIA: Chronic | ICD-10-CM

## 2024-04-23 DIAGNOSIS — I10 BENIGN ESSENTIAL HYPERTENSION: Primary | Chronic | ICD-10-CM

## 2024-04-23 PROCEDURE — 99214 OFFICE O/P EST MOD 30 MIN: CPT | Performed by: INTERNAL MEDICINE

## 2024-04-23 PROCEDURE — 3079F DIAST BP 80-89 MM HG: CPT | Performed by: INTERNAL MEDICINE

## 2024-04-23 PROCEDURE — 1159F MED LIST DOCD IN RCRD: CPT | Performed by: INTERNAL MEDICINE

## 2024-04-23 PROCEDURE — 3074F SYST BP LT 130 MM HG: CPT | Performed by: INTERNAL MEDICINE

## 2024-04-23 PROCEDURE — 1160F RVW MEDS BY RX/DR IN RCRD: CPT | Performed by: INTERNAL MEDICINE

## 2024-04-23 RX ORDER — PRIMIDONE 50 MG/1
TABLET ORAL
COMMUNITY
Start: 2024-03-08 | End: 2025-04-05

## 2024-04-23 NOTE — PROGRESS NOTES
Subjective        Chief Complaint   Patient presents with    Hypertension           Wm Mandel is a 63 y.o. male who presents for    Patient Active Problem List   Diagnosis    Pulmonary sarcoidosis    Onychomycosis    Benign essential hypertension    Benign prostatic hyperplasia    Decreased cardiac ejection fraction    Vitamin D deficiency    Stage 3a chronic kidney disease (CKD)    Other hyperlipidemia       History of Present Illness     He denies dizziness, chest pain or dyspnea. He has not been checking his BP. He eats healthy. He was started on Primidone with neuro for intention tremor.    Allergies   Allergen Reactions    Shingrix [Zoster Vac Recomb Adjuvanted] Rash       Current Outpatient Medications on File Prior to Visit   Medication Sig Dispense Refill    Acetaminophen (TYLENOL EXTRA STRENGTH PO) Take  by mouth.      atorvastatin (LIPITOR) 40 MG tablet TAKE 1 TABLET BY MOUTH EVERY DAY 90 tablet 1    cetirizine (zyrTEC) 10 MG tablet Take 1 tablet by mouth Daily for 7 days. 7 tablet 0    Cholecalciferol (VITAMIN D-3 PO) Take  by mouth.      hydrocortisone 2.5 % cream APPLY SPARINGLY TO AFFECTED AREA TWICE A DAY      losartan (COZAAR) 25 MG tablet Take 1 tablet by mouth Daily. 90 tablet 0    metoprolol succinate XL (TOPROL-XL) 25 MG 24 hr tablet Take 1 tablet by mouth Daily. 90 tablet 0    primidone (MYSOLINE) 50 MG tablet Take  by mouth.      spironolactone (ALDACTONE) 25 MG tablet Take 1 tablet by mouth Daily. 90 tablet 3    tamsulosin (FLOMAX) 0.4 MG capsule 24 hr capsule Take 1 capsule by mouth Daily. 30 capsule 1     No current facility-administered medications on file prior to visit.       Past Medical History:   Diagnosis Date    Leukocytoclastic vasculitis     Near syncope 05/27/2023    Tremor     seen at        Past Surgical History:   Procedure Laterality Date    COLONOSCOPY  10/13/2020    INTERSTIM PLACEMENT  2023    LUNG SURGERY      PROSTATE SURGERY      SUPRAPUBIC CATHETER INSERTION    "      Family History   Problem Relation Age of Onset    Cancer Mother     Other Father         gunshot       Social History     Socioeconomic History    Marital status: Single   Tobacco Use    Smoking status: Never    Smokeless tobacco: Never    Tobacco comments:     caffeine use- denies   Vaping Use    Vaping status: Never Used   Substance and Sexual Activity    Alcohol use: Never    Drug use: Never    Sexual activity: Yes     Birth control/protection: Condom           The following portions of the patient's history were reviewed and updated as appropriate: problem list, allergies, current medications, past medical history, past family history, past social history, and past surgical history.    Review of Systems    Immunization History   Administered Date(s) Administered    COVID-19 (PFIZER) Purple Cap Monovalent 04/01/2021, 04/22/2021, 11/10/2021    Flu Vaccine Quad PF >36MO 09/11/2020    Flu Vaccine Split Quad 02/04/2020    Fluzone (or Fluarix & Flulaval for VFC) >6mos 09/15/2022, 10/13/2023    Influenza, Unspecified 10/22/2021    Tdap 09/11/2020       Objective   Vitals:    04/23/24 1322   BP: 108/80   Weight: 71.2 kg (157 lb)   Height: 175.3 cm (69.02\")     Body mass index is 23.17 kg/m².  Physical Exam  Vitals reviewed.   Constitutional:       Appearance: He is well-developed.   HENT:      Head: Normocephalic and atraumatic.   Cardiovascular:      Rate and Rhythm: Normal rate and regular rhythm.      Heart sounds: Normal heart sounds, S1 normal and S2 normal.   Pulmonary:      Effort: Pulmonary effort is normal.      Breath sounds: Normal breath sounds.   Skin:     General: Skin is warm.   Neurological:      Mental Status: He is alert.   Psychiatric:         Behavior: Behavior normal.         Procedures    Assessment & Plan   Diagnoses and all orders for this visit:    1. Benign essential hypertension (Primary)  -     Comprehensive Metabolic Panel; Future    2. Other hyperlipidemia  -     Lipid Panel With / " Chol / HDL Ratio; Future               Reviewed bmp. BP is good. BP is good.     Return in about 6 months (around 10/23/2024) for Medicare Wellness, Lab Before FUP.

## 2024-05-06 DIAGNOSIS — I10 BENIGN ESSENTIAL HYPERTENSION: Chronic | ICD-10-CM

## 2024-05-06 RX ORDER — METOPROLOL SUCCINATE 25 MG/1
25 TABLET, EXTENDED RELEASE ORAL DAILY
Qty: 90 TABLET | Refills: 0 | Status: SHIPPED | OUTPATIENT
Start: 2024-05-06

## 2024-05-06 RX ORDER — LOSARTAN POTASSIUM 25 MG/1
25 TABLET ORAL DAILY
Qty: 90 TABLET | Refills: 0 | Status: SHIPPED | OUTPATIENT
Start: 2024-05-06

## 2024-05-30 DIAGNOSIS — R93.1 DECREASED CARDIAC EJECTION FRACTION: ICD-10-CM

## 2024-05-30 DIAGNOSIS — I10 BENIGN ESSENTIAL HYPERTENSION: Chronic | ICD-10-CM

## 2024-05-30 RX ORDER — SPIRONOLACTONE 25 MG/1
25 TABLET ORAL DAILY
Qty: 90 TABLET | Refills: 2 | Status: SHIPPED | OUTPATIENT
Start: 2024-05-30

## 2024-08-26 DIAGNOSIS — I10 BENIGN ESSENTIAL HYPERTENSION: Chronic | ICD-10-CM

## 2024-08-26 RX ORDER — LOSARTAN POTASSIUM 25 MG/1
25 TABLET ORAL DAILY
Qty: 90 TABLET | Refills: 0 | Status: SHIPPED | OUTPATIENT
Start: 2024-08-26

## 2024-08-26 RX ORDER — METOPROLOL SUCCINATE 25 MG/1
25 TABLET, EXTENDED RELEASE ORAL DAILY
Qty: 90 TABLET | Refills: 0 | Status: SHIPPED | OUTPATIENT
Start: 2024-08-26

## 2024-08-28 ENCOUNTER — OFFICE VISIT (OUTPATIENT)
Dept: CARDIOLOGY | Facility: CLINIC | Age: 64
End: 2024-08-28
Payer: MEDICARE

## 2024-08-28 VITALS
HEART RATE: 56 BPM | SYSTOLIC BLOOD PRESSURE: 110 MMHG | BODY MASS INDEX: 23.11 KG/M2 | HEIGHT: 69 IN | WEIGHT: 156 LBS | DIASTOLIC BLOOD PRESSURE: 70 MMHG

## 2024-08-28 DIAGNOSIS — I50.32 HEART FAILURE WITH RECOVERED EJECTION FRACTION (HFRECEF): Primary | ICD-10-CM

## 2024-08-28 DIAGNOSIS — I10 BENIGN ESSENTIAL HYPERTENSION: Chronic | ICD-10-CM

## 2024-08-28 DIAGNOSIS — I44.0 FIRST DEGREE AV BLOCK: ICD-10-CM

## 2024-08-28 DIAGNOSIS — I49.3 PVC (PREMATURE VENTRICULAR CONTRACTION): ICD-10-CM

## 2024-08-28 DIAGNOSIS — N18.31 STAGE 3A CHRONIC KIDNEY DISEASE (CKD): ICD-10-CM

## 2024-08-28 NOTE — PROGRESS NOTES
"Date of Office Visit: 24  Encounter Provider: BC Aly  Place of Service: [facility] CARDIOLOG[facility]  Patient Name: Wm Mandel  :1960    Chief Complaint   Patient presents with    Cardiomyopathy, unspecified type   :     HPI: Wm Mandel is a 63 y.o. male  with hypertension, frequent PVCs, cardiomyopathy, pulmonary sarcoidosis, BPH, essential tremor, urinary retention with suprapubic catheter since 2019, degree AV block, stage III chronic kidney disease, and hyperlipidemia.      He is a former patient of Dr. Daniel Craven.  He is now followed by Dr. Tejas Worthy.     He was found to have frequent PVCs with a burden of 16.6% in 2022.  Echocardiogram at that time showed an EF of 45 to 55%.  He was treated with Toprol-XL, losartan and spironolactone and his EF improved to 55%in 2022.  He previously was taken off Jardiance due to low blood pressure.      In May 2023, he was admitted with near syncope and blood pressure was found to be extremely low.  CT of the head showed no acute findings.  His Javier was decreased.    In 2023 he was doing well.      He presents today for 1 year follow-up.  He has been feeling well and denies chest pain or shortness of breath or edema or dizziness or palpitation.  No near-syncope or syncope.  He stays very active and is walking her bicycle 4 days a week for an hour.  He has no exertional symptoms.    Allergies   Allergen Reactions    Shingrix [Zoster Vac Recomb Adjuvanted] Rash           Family and social history reviewed.     ROS  All other systems were reviewed and are negative          Objective:     Vitals:    24 0923   BP: 110/70   BP Location: Left arm   Patient Position: Sitting   Pulse: 56   Weight: 70.8 kg (156 lb)   Height: 175.3 cm (69\")     Body mass index is 23.04 kg/m².    PHYSICAL EXAM:  Pulmonary:      Effort: Pulmonary effort is normal.      Breath sounds: Normal breath sounds. "   Cardiovascular:      Normal rate. Regular rhythm.           ECG 12 Lead    Date/Time: 8/28/2024 9:56 AM  Performed by: Viola Vickers APRN    Authorized by: Viola Vickers APRN  Comparison: compared with previous ECG   Similar to previous ECG  Rate: normal  Conduction: 1st degree AV block  T flattening: III  QRS axis: normal  Comments: No change            Current Outpatient Medications   Medication Sig Dispense Refill    Acetaminophen (TYLENOL EXTRA STRENGTH PO) Take  by mouth.      atorvastatin (LIPITOR) 40 MG tablet TAKE 1 TABLET BY MOUTH EVERY DAY 90 tablet 1    Cholecalciferol (VITAMIN D-3 PO) Take  by mouth.      hydrocortisone 2.5 % cream APPLY SPARINGLY TO AFFECTED AREA TWICE A DAY      losartan (COZAAR) 25 MG tablet TAKE 1 TABLET BY MOUTH EVERY DAY 90 tablet 0    metoprolol succinate XL (TOPROL-XL) 25 MG 24 hr tablet TAKE 1 TABLET BY MOUTH EVERY DAY 90 tablet 0    primidone (MYSOLINE) 50 MG tablet Take  by mouth.      spironolactone (ALDACTONE) 25 MG tablet Take 1 tablet by mouth Daily. 90 tablet 2    tamsulosin (FLOMAX) 0.4 MG capsule 24 hr capsule Take 1 capsule by mouth Daily. 30 capsule 1    cetirizine (zyrTEC) 10 MG tablet Take 1 tablet by mouth Daily for 7 days. 7 tablet 0     No current facility-administered medications for this visit.     Assessment:       Diagnosis Plan   1. Heart failure with recovered ejection fraction (HFrecEF)             No orders of the defined types were placed in this encounter.        Plan:       1.  63-year-old gentleman with heart failure with recovered ejection fraction, etiology uncertain but could be hypertensive heart disease versus arrhythmia induced with frequent PVCs.  He denies palpitations.  He will continue Toprol-XL 25 mg daily, spironolactone 25 mg daily and losartan 25 mg daily.  2.  History of frequent PVCs accounting for 16% of prior monitor in 02/2022.  Initial EF of 45-50% which improved to normal on echo 06/2022 and EF last normal on echo in May  2023-he denies palpitations  3.  Hypertension-blood pressure appears well-controlled  4.  Hyperlipidemia continue pravastatin 40 mg.  Reviewed his last lipid panel from October showing LDL of 51  5.  Pulmonary sarcoidosis-denies dyspnea  6.  BPH on tamsulosin.  He has had prior InterStim 03/23.  Follows with urology, [facility]  7.  Urinary retention with suprapubic catheter since 2019  8.Stage IIIa chronic kidney disease-followed by PCP.  Previously was tried on Jardiance for cardiomyopathy but had low blood pressure.    9.  First-degree AV block-chronic and unchanged  10.  Essential tremor on primidone    Follow-up in 1 year in cardiology clinic          It has been a pleasure to participate in this patient's care.      Thank you,  BC Aly      **I used Dragon to dictate this note:**

## 2024-09-26 ENCOUNTER — EXTERNAL PBMM DATA (OUTPATIENT)
Dept: PHARMACY | Facility: OTHER | Age: 64
End: 2024-09-26
Payer: MEDICARE

## 2024-10-02 ENCOUNTER — POP HEALTH PHARMACY (OUTPATIENT)
Dept: PHARMACY | Facility: OTHER | Age: 64
End: 2024-10-02
Payer: MEDICARE

## 2024-10-15 DIAGNOSIS — E78.49 OTHER HYPERLIPIDEMIA: Chronic | ICD-10-CM

## 2024-10-15 DIAGNOSIS — I10 BENIGN ESSENTIAL HYPERTENSION: Chronic | ICD-10-CM

## 2024-10-17 ENCOUNTER — POP HEALTH PHARMACY (OUTPATIENT)
Dept: PHARMACY | Facility: OTHER | Age: 64
End: 2024-10-17
Payer: MEDICARE

## 2024-10-17 LAB
ALBUMIN SERPL-MCNC: 4.4 G/DL (ref 3.5–5.2)
ALBUMIN/GLOB SERPL: 1.6 G/DL
ALP SERPL-CCNC: 72 U/L (ref 39–117)
ALT SERPL-CCNC: 38 U/L (ref 1–41)
AST SERPL-CCNC: 37 U/L (ref 1–40)
BILIRUB SERPL-MCNC: 0.4 MG/DL (ref 0–1.2)
BUN SERPL-MCNC: 20 MG/DL (ref 8–23)
BUN/CREAT SERPL: 14.4 (ref 7–25)
CALCIUM SERPL-MCNC: 9 MG/DL (ref 8.6–10.5)
CHLORIDE SERPL-SCNC: 102 MMOL/L (ref 98–107)
CO2 SERPL-SCNC: 25.1 MMOL/L (ref 22–29)
CREAT SERPL-MCNC: 1.39 MG/DL (ref 0.76–1.27)
EGFRCR SERPLBLD CKD-EPI 2021: 56.3 ML/MIN/1.73
GLOBULIN SER CALC-MCNC: 2.8 GM/DL
GLUCOSE SERPL-MCNC: 92 MG/DL (ref 65–99)
POTASSIUM SERPL-SCNC: 4.1 MMOL/L (ref 3.5–5.2)
PROT SERPL-MCNC: 7.2 G/DL (ref 6–8.5)
SODIUM SERPL-SCNC: 139 MMOL/L (ref 136–145)

## 2024-10-24 ENCOUNTER — EXTERNAL PBMM DATA (OUTPATIENT)
Dept: PHARMACY | Facility: OTHER | Age: 64
End: 2024-10-24
Payer: MEDICARE

## 2024-10-24 ENCOUNTER — OFFICE VISIT (OUTPATIENT)
Dept: INTERNAL MEDICINE | Facility: CLINIC | Age: 64
End: 2024-10-24
Payer: MEDICARE

## 2024-10-24 VITALS
HEIGHT: 69 IN | SYSTOLIC BLOOD PRESSURE: 140 MMHG | DIASTOLIC BLOOD PRESSURE: 80 MMHG | BODY MASS INDEX: 23.52 KG/M2 | WEIGHT: 158.8 LBS

## 2024-10-24 DIAGNOSIS — E78.00 HIGH CHOLESTEROL: Chronic | ICD-10-CM

## 2024-10-24 DIAGNOSIS — Z00.00 ENCOUNTER FOR SUBSEQUENT ANNUAL WELLNESS VISIT (AWV) IN MEDICARE PATIENT: Primary | ICD-10-CM

## 2024-10-24 DIAGNOSIS — H61.23 BILATERAL IMPACTED CERUMEN: ICD-10-CM

## 2024-10-24 DIAGNOSIS — Z23 NEED FOR INFLUENZA VACCINATION: ICD-10-CM

## 2024-10-24 DIAGNOSIS — R93.1 DECREASED CARDIAC EJECTION FRACTION: Chronic | ICD-10-CM

## 2024-10-24 DIAGNOSIS — I10 BENIGN ESSENTIAL HYPERTENSION: Chronic | ICD-10-CM

## 2024-10-24 PROCEDURE — 99213 OFFICE O/P EST LOW 20 MIN: CPT | Performed by: INTERNAL MEDICINE

## 2024-10-24 PROCEDURE — 90662 IIV NO PRSV INCREASED AG IM: CPT | Performed by: INTERNAL MEDICINE

## 2024-10-24 PROCEDURE — G0009 ADMIN PNEUMOCOCCAL VACCINE: HCPCS | Performed by: INTERNAL MEDICINE

## 2024-10-24 PROCEDURE — 69209 REMOVE IMPACTED EAR WAX UNI: CPT | Performed by: INTERNAL MEDICINE

## 2024-10-24 PROCEDURE — 1160F RVW MEDS BY RX/DR IN RCRD: CPT | Performed by: INTERNAL MEDICINE

## 2024-10-24 PROCEDURE — 3077F SYST BP >= 140 MM HG: CPT | Performed by: INTERNAL MEDICINE

## 2024-10-24 PROCEDURE — 90677 PCV20 VACCINE IM: CPT | Performed by: INTERNAL MEDICINE

## 2024-10-24 PROCEDURE — 3079F DIAST BP 80-89 MM HG: CPT | Performed by: INTERNAL MEDICINE

## 2024-10-24 PROCEDURE — G0008 ADMIN INFLUENZA VIRUS VAC: HCPCS | Performed by: INTERNAL MEDICINE

## 2024-10-24 PROCEDURE — 1159F MED LIST DOCD IN RCRD: CPT | Performed by: INTERNAL MEDICINE

## 2024-10-24 PROCEDURE — 1170F FXNL STATUS ASSESSED: CPT | Performed by: INTERNAL MEDICINE

## 2024-10-24 PROCEDURE — G0439 PPPS, SUBSEQ VISIT: HCPCS | Performed by: INTERNAL MEDICINE

## 2024-10-24 NOTE — PROGRESS NOTES
Subjective   The ABCs of the Annual Wellness Visit  Medicare Wellness Visit      Wm Mandel is a 64 y.o. patient who presents for a Medicare Wellness Visit.    The following portions of the patient's history were reviewed and   updated as appropriate: allergies, current medications, past family history, past medical history, past social history, past surgical history, and problem list.    Compared to one year ago, the patient's physical   health is the same.  Compared to one year ago, the patient's mental   health is the same.    Recent Hospitalizations:  He was not admitted to the hospital during the last year.     Current Medical Providers:  Patient Care Team:  Dale Andrade MD as PCP - General (Internal Medicine)    Outpatient Medications Prior to Visit   Medication Sig Dispense Refill    Acetaminophen (TYLENOL EXTRA STRENGTH PO) Take  by mouth.      atorvastatin (LIPITOR) 40 MG tablet TAKE 1 TABLET BY MOUTH EVERY DAY 90 tablet 1    Cholecalciferol (VITAMIN D-3 PO) Take  by mouth.      hydrocortisone 2.5 % cream APPLY SPARINGLY TO AFFECTED AREA TWICE A DAY      losartan (COZAAR) 25 MG tablet TAKE 1 TABLET BY MOUTH EVERY DAY 90 tablet 0    metoprolol succinate XL (TOPROL-XL) 25 MG 24 hr tablet TAKE 1 TABLET BY MOUTH EVERY DAY 90 tablet 0    primidone (MYSOLINE) 50 MG tablet Take  by mouth.      spironolactone (ALDACTONE) 25 MG tablet Take 1 tablet by mouth Daily. 90 tablet 2    tamsulosin (FLOMAX) 0.4 MG capsule 24 hr capsule Take 1 capsule by mouth Daily. 30 capsule 1    cetirizine (zyrTEC) 10 MG tablet Take 1 tablet by mouth Daily for 7 days. 7 tablet 0     No facility-administered medications prior to visit.     No opioid medication identified on active medication list. I have reviewed chart for other potential  high risk medication/s and harmful drug interactions in the elderly.      Aspirin is not on active medication list.  Aspirin use is not indicated based on review of current medical  "condition/s. Risk of harm outweighs potential benefits.  .    Patient Active Problem List   Diagnosis    Pulmonary sarcoidosis    Onychomycosis    Benign essential hypertension    Benign prostatic hyperplasia    Decreased cardiac ejection fraction    Vitamin D deficiency    Stage 3a chronic kidney disease (CKD)    High cholesterol     Advance Care Planning Advance Directive is on file.  ACP discussion was held with the patient during this visit. Patient has an advance directive in EMR which is still valid.             Objective   Vitals:    10/24/24 1322   BP: 140/80   Weight: 72 kg (158 lb 12.8 oz)   Height: 175.3 cm (69.02\")       Estimated body mass index is 23.44 kg/m² as calculated from the following:    Height as of this encounter: 175.3 cm (69.02\").    Weight as of this encounter: 72 kg (158 lb 12.8 oz).    BMI is within normal parameters. No other follow-up for BMI required.       Does the patient have evidence of cognitive impairment? 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)   Smokeless Tobacco Never   Tobacco Comments    caffeine use- denies     Alcohol Consumption:  Social History     Substance and Sexual Activity   Alcohol Use Never       Fall Risk Screen  STEADI Fall Risk Assessment was completed, and patient is at LOW risk for falls.Assessment completed " on:10/24/2024    Depression Screening:      10/24/2024     1:24 PM   PHQ-2/PHQ-9 Depression Screening   Little interest or pleasure in doing things Not at all   Feeling down, depressed, or hopeless Not at all     Health Habits and Functional and Cognitive Screening:      10/24/2024     1:24 PM   Functional & Cognitive Status   Do you have difficulty preparing food and eating? No   Do you have difficulty bathing yourself, getting dressed or grooming yourself? No   Do you have difficulty using the toilet? No   Do you have difficulty moving around from place to place? No   Do you have trouble with steps or getting out of a bed or a chair? No   Current Diet Well Balanced Diet   Dental Exam Up to date   Eye Exam Up to date   Exercise (times per week) 1 times per week   Current Exercises Include Walking;Treadmill;Stationary Bicycling/Spin Class   Do you need help using the phone?  No   Are you deaf or do you have serious difficulty hearing?  No   Do you need help to go to places out of walking distance? No   Do you need help shopping? No   Do you need help preparing meals?  No   Do you need help with housework?  No   Do you need help with laundry? No   Do you need help taking your medications? No   Do you need help managing money? No   Do you ever drive or ride in a car without wearing a seat belt? No   Have you felt unusual stress, anger or loneliness in the last month? No   Who do you live with? Alone   If you need help, do you have trouble finding someone available to you? No   Have you been bothered in the last four weeks by sexual problems? No   Do you have difficulty concentrating, remembering or making decisions? No           Age-appropriate Screening Schedule:  Refer to the list below for future screening recommendations based on patient's age, sex and/or medical conditions. Orders for these recommended tests are listed in the plan section. The patient has been provided with a written plan.    Health Maintenance  "List  Health Maintenance   Topic Date Due    COVID-19 Vaccine (4 - 2023-24 season) 09/01/2024    LIPID PANEL  10/06/2024    ANNUAL WELLNESS VISIT  10/24/2025    TDAP/TD VACCINES (2 - Td or Tdap) 09/11/2030    COLORECTAL CANCER SCREENING  10/13/2030    HEPATITIS C SCREENING  Completed    INFLUENZA VACCINE  Completed    Pneumococcal Vaccine 65+  Completed                                                                                                                                                CMS Preventative Services Quick Reference  Risk Factors Identified During Encounter  Immunizations Discussed/Encouraged: Influenza and Prevnar 20 (Pneumococcal 20-valent conjugate)    The above risks/problems have been discussed with the patient.  Pertinent information has been shared with the patient in the After Visit Summary.  An After Visit Summary and PPPS were made available to the patient.    Follow Up:   Next Medicare Wellness visit to be scheduled in 1 year.         Additional E&M Note during same encounter follows:  Patient has additional, significant, and separately identifiable condition(s)/problem(s) that require work above and beyond the Medicare Wellness Visit     Chief Complaint  Medicare Wellness-subsequent    Subjective   HPI    Denies dizziness, chest pain or dyspnea. He saw cards who felt that he was stable. He would like his ears cleaned out.              Objective   Vital Signs:  /80   Ht 175.3 cm (69.02\")   Wt 72 kg (158 lb 12.8 oz)   BMI 23.44 kg/m²   Physical Exam  Vitals reviewed.   Constitutional:       Appearance: He is well-developed.   HENT:      Head: Normocephalic and atraumatic.      Ears:      Comments: External canals occluded with cerumen. TM s nl after cerumen removed by Radha  Neck:      Vascular: No carotid bruit.   Cardiovascular:      Rate and Rhythm: Normal rate and regular rhythm.      Heart sounds: Normal heart sounds, S1 normal and S2 normal.   Pulmonary:      Effort: " Pulmonary effort is normal.      Breath sounds: Normal breath sounds.   Skin:     General: Skin is warm.   Neurological:      Mental Status: He is alert.   Psychiatric:         Behavior: Behavior normal.                 Assessment and Plan               Encounter for subsequent annual wellness visit (AWV) in Medicare patient    High cholesterol     Benign essential hypertension    Decreased cardiac ejection fraction    Bilateral impacted cerumen    Need for influenza vaccination    Diagnoses and all orders for this visit:    1. Encounter for subsequent annual wellness visit (AWV) in Medicare patient (Primary)    2. High cholesterol  Comments:  Check next time as some reason lab did not draw.  Assessment & Plan:       Orders:  -     Lipid Panel With / Chol / HDL Ratio; Future    3. Benign essential hypertension  Comments:  He did not take his meds today  Assessment & Plan:      Orders:  -     Comprehensive Metabolic Panel; Future    4. Decreased cardiac ejection fraction  Comments:  Doing well. He saw cards.    5. Bilateral impacted cerumen  -     Ear Cerumen Removal    6. Need for influenza vaccination  -     Fluzone High-Dose 65+yrs    Other orders  -     Pneumococcal Conjugate Vaccine 20-Valent All               Follow Up   Return in about 6 months (around 4/24/2025), or 30 minutes, for Lab Before FUP.  Patient was given instructions and counseling regarding his condition or for health maintenance advice. Please see specific information pulled into the AVS if appropriate.    Reviewed cmp. Flu and COVID shots today.

## 2024-11-05 DIAGNOSIS — E78.49 OTHER HYPERLIPIDEMIA: Chronic | ICD-10-CM

## 2024-11-05 RX ORDER — ATORVASTATIN CALCIUM 40 MG/1
40 TABLET, FILM COATED ORAL DAILY
Qty: 90 TABLET | Refills: 1 | Status: SHIPPED | OUTPATIENT
Start: 2024-11-05

## 2024-12-13 ENCOUNTER — POP HEALTH PHARMACY (OUTPATIENT)
Dept: PHARMACY | Facility: OTHER | Age: 64
End: 2024-12-13
Payer: MEDICARE

## 2025-01-19 DIAGNOSIS — I10 BENIGN ESSENTIAL HYPERTENSION: Chronic | ICD-10-CM

## 2025-01-20 RX ORDER — METOPROLOL SUCCINATE 25 MG/1
25 TABLET, EXTENDED RELEASE ORAL DAILY
Qty: 90 TABLET | Refills: 3 | Status: SHIPPED | OUTPATIENT
Start: 2025-01-20

## 2025-01-20 RX ORDER — LOSARTAN POTASSIUM 25 MG/1
25 TABLET ORAL DAILY
Qty: 90 TABLET | Refills: 3 | Status: SHIPPED | OUTPATIENT
Start: 2025-01-20

## 2025-04-17 LAB
CHOLEST SERPL-MCNC: 118 MG/DL (ref 0–200)
CHOLEST/HDLC SERPL: 3.37 {RATIO}
HDLC SERPL-MCNC: 35 MG/DL (ref 40–60)
LDLC SERPL CALC-MCNC: 60 MG/DL (ref 0–100)
TRIGL SERPL-MCNC: 130 MG/DL (ref 0–150)
VLDLC SERPL CALC-MCNC: 23 MG/DL (ref 5–40)

## 2025-05-12 ENCOUNTER — OFFICE VISIT (OUTPATIENT)
Dept: INTERNAL MEDICINE | Facility: CLINIC | Age: 65
End: 2025-05-12
Payer: MEDICARE

## 2025-05-12 VITALS
HEIGHT: 69 IN | BODY MASS INDEX: 24.73 KG/M2 | SYSTOLIC BLOOD PRESSURE: 128 MMHG | DIASTOLIC BLOOD PRESSURE: 76 MMHG | WEIGHT: 167 LBS

## 2025-05-12 DIAGNOSIS — I10 BENIGN ESSENTIAL HYPERTENSION: Chronic | ICD-10-CM

## 2025-05-12 DIAGNOSIS — E78.00 HIGH CHOLESTEROL: Primary | Chronic | ICD-10-CM

## 2025-05-12 DIAGNOSIS — D36.9 TUBULAR ADENOMA: ICD-10-CM

## 2025-05-12 DIAGNOSIS — N18.31 STAGE 3A CHRONIC KIDNEY DISEASE (CKD): ICD-10-CM

## 2025-05-12 PROCEDURE — 3074F SYST BP LT 130 MM HG: CPT | Performed by: INTERNAL MEDICINE

## 2025-05-12 PROCEDURE — 99214 OFFICE O/P EST MOD 30 MIN: CPT | Performed by: INTERNAL MEDICINE

## 2025-05-12 PROCEDURE — 93000 ELECTROCARDIOGRAM COMPLETE: CPT | Performed by: INTERNAL MEDICINE

## 2025-05-12 PROCEDURE — 1160F RVW MEDS BY RX/DR IN RCRD: CPT | Performed by: INTERNAL MEDICINE

## 2025-05-12 PROCEDURE — 3078F DIAST BP <80 MM HG: CPT | Performed by: INTERNAL MEDICINE

## 2025-05-12 PROCEDURE — 1159F MED LIST DOCD IN RCRD: CPT | Performed by: INTERNAL MEDICINE

## 2025-05-12 NOTE — PROGRESS NOTES
Subjective        Chief Complaint   Patient presents with    Hyperlipidemia           Wm Mandel is a 64 y.o. male who presents for    Patient Active Problem List   Diagnosis    Pulmonary sarcoidosis    Onychomycosis    Benign essential hypertension    Benign prostatic hyperplasia    Decreased cardiac ejection fraction    Vitamin D deficiency    Stage 3a chronic kidney disease (CKD)    High cholesterol       History of Present Illness     Denies chest pain or dyspnea. He has not been checking his BP. He fell on the ice earlier in the year and has been using Tiger Balm on it.    Allergies   Allergen Reactions    Shingrix [Zoster Vac Recomb Adjuvanted] Rash       Current Outpatient Medications on File Prior to Visit   Medication Sig Dispense Refill    Acetaminophen (TYLENOL EXTRA STRENGTH PO) Take  by mouth.      atorvastatin (LIPITOR) 40 MG tablet TAKE 1 TABLET BY MOUTH EVERY DAY 90 tablet 1    Cholecalciferol (VITAMIN D-3 PO) Take  by mouth.      hydrocortisone 2.5 % cream APPLY SPARINGLY TO AFFECTED AREA TWICE A DAY      losartan (COZAAR) 25 MG tablet TAKE 1 TABLET BY MOUTH EVERY DAY 90 tablet 3    metoprolol succinate XL (TOPROL-XL) 25 MG 24 hr tablet TAKE 1 TABLET BY MOUTH EVERY DAY 90 tablet 3    primidone (MYSOLINE) 50 MG tablet Take  by mouth.      spironolactone (ALDACTONE) 25 MG tablet Take 1 tablet by mouth Daily. 90 tablet 2    tamsulosin (FLOMAX) 0.4 MG capsule 24 hr capsule Take 1 capsule by mouth Daily. 30 capsule 1    cetirizine (zyrTEC) 10 MG tablet Take 1 tablet by mouth Daily for 7 days. 7 tablet 0     No current facility-administered medications on file prior to visit.       Past Medical History:   Diagnosis Date    Leukocytoclastic vasculitis     Near syncope 05/27/2023    Tremor     seen at     Tubular adenoma 10/13/2020       Past Surgical History:   Procedure Laterality Date    COLONOSCOPY  10/13/2020    INTERSTIM PLACEMENT  2023    LUNG SURGERY      PROSTATE SURGERY      SUPRAPUBIC  "CATHETER INSERTION         Family History   Problem Relation Age of Onset    Cancer Mother     Other Father         gunshot       Social History     Socioeconomic History    Marital status: Single   Tobacco Use    Smoking status: Never     Passive exposure: Past (mother smoked in the shop. 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    Review of Systems    Immunization History   Administered Date(s) Administered    COVID-19 (PFIZER) Purple Cap Monovalent 04/01/2021, 04/22/2021, 11/10/2021    Flu Vaccine Quad PF >36MO 09/11/2020    Flu Vaccine Split Quad 02/04/2020    Fluzone (or Fluarix & Flulaval for VFC) >6mos 09/15/2022, 10/13/2023    Fluzone High-Dose 65+YRS 10/24/2024    Influenza, Unspecified 10/22/2021    Pneumococcal Conjugate 20-Valent (PCV20) 10/24/2024    Tdap 09/11/2020       Objective   Vitals:    05/12/25 1310   BP: 128/76   Weight: 75.8 kg (167 lb)   Height: 175.3 cm (69.02\")     Body mass index is 24.65 kg/m².  Physical Exam  Vitals reviewed.   Constitutional:       Appearance: He is well-developed.   HENT:      Head: Normocephalic and atraumatic.   Cardiovascular:      Rate and Rhythm: Normal rate and regular rhythm. Occasional Extrasystoles are present.     Heart sounds: Normal heart sounds, S1 normal and S2 normal.   Pulmonary:      Effort: Pulmonary effort is normal.      Breath sounds: Normal breath sounds.   Skin:     General: Skin is warm.   Neurological:      Mental Status: He " is alert.   Psychiatric:         Behavior: Behavior normal.           ECG 12 Lead    Date/Time: 5/12/2025 1:53 PM  Performed by: Dale Andrade MD    Authorized by: Dale Andrade MD  Comparison: compared with previous ECG from 8/28/2024  Similar to previous ECG  Rhythm: sinus rhythm  Rate: normal  Conduction: conduction normal  QRS axis: normal    Clinical impression: normal ECG          Assessment & Plan   Diagnoses and all orders for this visit:    1. High cholesterol (Primary)  Comments:  LDL is good.  Orders:  -     Comprehensive Metabolic Panel    2. Benign essential hypertension  -     ECG 12 Lead    3. Stage 3a chronic kidney disease (CKD)  -     Comprehensive Metabolic Panel  -     Basic Metabolic Panel; Future    4. Tubular adenoma  Comments:  Due for cscope in October      Reviewed flp. Collect cmp today. EKG unchanged. BP is good.             Return in 6 months (on 11/12/2025) for Lab Before FUP, Lab Today, Medicare Wellness.

## 2025-05-13 LAB
ALBUMIN SERPL-MCNC: 4.1 G/DL (ref 3.5–5.2)
ALBUMIN/GLOB SERPL: 1.6 G/DL
ALP SERPL-CCNC: 64 U/L (ref 39–117)
ALT SERPL-CCNC: 22 U/L (ref 1–41)
AST SERPL-CCNC: 24 U/L (ref 1–40)
BILIRUB SERPL-MCNC: 0.2 MG/DL (ref 0–1.2)
BUN SERPL-MCNC: 16 MG/DL (ref 8–23)
BUN/CREAT SERPL: 11.9 (ref 7–25)
CALCIUM SERPL-MCNC: 8.7 MG/DL (ref 8.6–10.5)
CHLORIDE SERPL-SCNC: 103 MMOL/L (ref 98–107)
CO2 SERPL-SCNC: 26 MMOL/L (ref 22–29)
CREAT SERPL-MCNC: 1.35 MG/DL (ref 0.76–1.27)
EGFRCR SERPLBLD CKD-EPI 2021: 58.3 ML/MIN/1.73
GLOBULIN SER CALC-MCNC: 2.6 GM/DL
GLUCOSE SERPL-MCNC: 81 MG/DL (ref 65–99)
POTASSIUM SERPL-SCNC: 4.2 MMOL/L (ref 3.5–5.2)
PROT SERPL-MCNC: 6.7 G/DL (ref 6–8.5)
SODIUM SERPL-SCNC: 139 MMOL/L (ref 136–145)

## 2025-05-15 ENCOUNTER — TELEPHONE (OUTPATIENT)
Dept: INTERNAL MEDICINE | Facility: CLINIC | Age: 65
End: 2025-05-15
Payer: MEDICARE

## 2025-05-15 NOTE — TELEPHONE ENCOUNTER
Patient is returning a call regarding his lab results (kidney), patient was given the message from Dr. Andrade from 5/13/25, however the patient is wanting to know should he be drinking more water or what can he do to make it better, please call [phone].

## 2025-06-06 DIAGNOSIS — I10 BENIGN ESSENTIAL HYPERTENSION: Chronic | ICD-10-CM

## 2025-06-06 DIAGNOSIS — R93.1 DECREASED CARDIAC EJECTION FRACTION: ICD-10-CM

## 2025-06-06 RX ORDER — SPIRONOLACTONE 25 MG/1
25 TABLET ORAL DAILY
Qty: 90 TABLET | Refills: 2 | Status: SHIPPED | OUTPATIENT
Start: 2025-06-06

## 2025-08-13 DIAGNOSIS — E78.49 OTHER HYPERLIPIDEMIA: Chronic | ICD-10-CM

## 2025-08-13 RX ORDER — ATORVASTATIN CALCIUM 40 MG/1
40 TABLET, FILM COATED ORAL DAILY
Qty: 90 TABLET | Refills: 1 | Status: SHIPPED | OUTPATIENT
Start: 2025-08-13